# Patient Record
Sex: FEMALE | Race: WHITE | NOT HISPANIC OR LATINO
[De-identification: names, ages, dates, MRNs, and addresses within clinical notes are randomized per-mention and may not be internally consistent; named-entity substitution may affect disease eponyms.]

---

## 2017-01-10 ENCOUNTER — NON-APPOINTMENT (OUTPATIENT)
Age: 82
End: 2017-01-10

## 2017-01-10 ENCOUNTER — APPOINTMENT (OUTPATIENT)
Dept: CARDIOLOGY | Facility: CLINIC | Age: 82
End: 2017-01-10

## 2017-01-10 ENCOUNTER — LABORATORY RESULT (OUTPATIENT)
Age: 82
End: 2017-01-10

## 2017-01-10 VITALS
HEART RATE: 74 BPM | SYSTOLIC BLOOD PRESSURE: 160 MMHG | OXYGEN SATURATION: 95 % | RESPIRATION RATE: 14 BRPM | TEMPERATURE: 97.9 F | DIASTOLIC BLOOD PRESSURE: 80 MMHG

## 2017-01-10 VITALS — DIASTOLIC BLOOD PRESSURE: 84 MMHG | SYSTOLIC BLOOD PRESSURE: 210 MMHG

## 2017-01-13 ENCOUNTER — MEDICATION RENEWAL (OUTPATIENT)
Age: 82
End: 2017-01-13

## 2017-01-13 LAB
APPEARANCE: CLEAR
BILIRUBIN URINE: NEGATIVE
BLOOD URINE: NEGATIVE
COLOR: YELLOW
GLUCOSE QUALITATIVE U: NORMAL MG/DL
KETONES URINE: NEGATIVE
LEUKOCYTE ESTERASE URINE: NEGATIVE
NITRITE URINE: POSITIVE
PH URINE: 6
PROTEIN URINE: ABNORMAL MG/DL
SPECIFIC GRAVITY URINE: 1.02
UROBILINOGEN URINE: 1 MG/DL

## 2017-01-13 RX ORDER — SULFAMETHOXAZOLE AND TRIMETHOPRIM 800; 160 MG/1; MG/1
800-160 TABLET ORAL TWICE DAILY
Qty: 20 | Refills: 0 | Status: ACTIVE | COMMUNITY
Start: 2017-01-13 | End: 1900-01-01

## 2017-01-16 ENCOUNTER — LABORATORY RESULT (OUTPATIENT)
Age: 82
End: 2017-01-16

## 2017-01-17 ENCOUNTER — APPOINTMENT (OUTPATIENT)
Dept: CARDIOLOGY | Facility: CLINIC | Age: 82
End: 2017-01-17

## 2017-01-17 VITALS
OXYGEN SATURATION: 96 % | DIASTOLIC BLOOD PRESSURE: 100 MMHG | HEART RATE: 71 BPM | SYSTOLIC BLOOD PRESSURE: 180 MMHG | WEIGHT: 116 LBS | BODY MASS INDEX: 20.55 KG/M2 | HEIGHT: 63 IN

## 2017-01-18 ENCOUNTER — NON-APPOINTMENT (OUTPATIENT)
Age: 82
End: 2017-01-18

## 2017-01-18 LAB
ALBUMIN SERPL ELPH-MCNC: 4.3 G/DL
ALP BLD-CCNC: 67 U/L
ALT SERPL-CCNC: 17 U/L
ANION GAP SERPL CALC-SCNC: 13 MMOL/L
AST SERPL-CCNC: 29 U/L
BILIRUB SERPL-MCNC: 0.3 MG/DL
BUN SERPL-MCNC: 28 MG/DL
CALCIUM SERPL-MCNC: 9.4 MG/DL
CHLORIDE SERPL-SCNC: 101 MMOL/L
CO2 SERPL-SCNC: 25 MMOL/L
CREAT SERPL-MCNC: 0.91 MG/DL
GLUCOSE SERPL-MCNC: 101 MG/DL
MAGNESIUM SERPL-MCNC: 2.3 MG/DL
POTASSIUM SERPL-SCNC: 4.6 MMOL/L
PROT SERPL-MCNC: 7.1 G/DL
SODIUM SERPL-SCNC: 139 MMOL/L

## 2017-01-23 ENCOUNTER — MEDICATION RENEWAL (OUTPATIENT)
Age: 82
End: 2017-01-23

## 2017-01-31 ENCOUNTER — APPOINTMENT (OUTPATIENT)
Dept: CARDIOLOGY | Facility: CLINIC | Age: 82
End: 2017-01-31

## 2017-02-03 ENCOUNTER — RX RENEWAL (OUTPATIENT)
Age: 82
End: 2017-02-03

## 2017-02-17 ENCOUNTER — APPOINTMENT (OUTPATIENT)
Dept: CARDIOLOGY | Facility: CLINIC | Age: 82
End: 2017-02-17

## 2017-02-27 ENCOUNTER — MEDICATION RENEWAL (OUTPATIENT)
Age: 82
End: 2017-02-27

## 2017-03-07 ENCOUNTER — APPOINTMENT (OUTPATIENT)
Dept: CARDIOLOGY | Facility: CLINIC | Age: 82
End: 2017-03-07

## 2017-03-07 VITALS
WEIGHT: 112 LBS | HEIGHT: 63 IN | DIASTOLIC BLOOD PRESSURE: 70 MMHG | BODY MASS INDEX: 19.84 KG/M2 | OXYGEN SATURATION: 95 % | SYSTOLIC BLOOD PRESSURE: 140 MMHG

## 2017-03-07 DIAGNOSIS — Z87.19 PERSONAL HISTORY OF OTHER DISEASES OF THE DIGESTIVE SYSTEM: ICD-10-CM

## 2017-03-07 DIAGNOSIS — Z87.440 PERSONAL HISTORY OF URINARY (TRACT) INFECTIONS: ICD-10-CM

## 2017-03-07 RX ORDER — AMLODIPINE BESYLATE 2.5 MG/1
2.5 TABLET ORAL
Qty: 30 | Refills: 0 | Status: DISCONTINUED | COMMUNITY
Start: 2017-01-10 | End: 2017-03-07

## 2017-03-08 ENCOUNTER — NON-APPOINTMENT (OUTPATIENT)
Age: 82
End: 2017-03-08

## 2017-04-24 ENCOUNTER — RX RENEWAL (OUTPATIENT)
Age: 82
End: 2017-04-24

## 2017-05-13 ENCOUNTER — EMERGENCY (EMERGENCY)
Facility: HOSPITAL | Age: 82
LOS: 0 days | Discharge: HOME | End: 2017-05-13
Attending: FAMILY MEDICINE | Admitting: FAMILY MEDICINE
Payer: MEDICARE

## 2017-05-13 VITALS
TEMPERATURE: 99 F | DIASTOLIC BLOOD PRESSURE: 90 MMHG | HEIGHT: 63 IN | OXYGEN SATURATION: 100 % | SYSTOLIC BLOOD PRESSURE: 238 MMHG | RESPIRATION RATE: 19 BRPM | HEART RATE: 76 BPM | WEIGHT: 115.08 LBS

## 2017-05-13 VITALS — DIASTOLIC BLOOD PRESSURE: 72 MMHG | HEART RATE: 75 BPM | SYSTOLIC BLOOD PRESSURE: 160 MMHG

## 2017-05-13 DIAGNOSIS — Z87.891 PERSONAL HISTORY OF NICOTINE DEPENDENCE: ICD-10-CM

## 2017-05-13 DIAGNOSIS — N39.0 URINARY TRACT INFECTION, SITE NOT SPECIFIED: ICD-10-CM

## 2017-05-13 DIAGNOSIS — Z79.82 LONG TERM (CURRENT) USE OF ASPIRIN: ICD-10-CM

## 2017-05-13 DIAGNOSIS — B34.8 OTHER VIRAL INFECTIONS OF UNSPECIFIED SITE: ICD-10-CM

## 2017-05-13 DIAGNOSIS — Z79.02 LONG TERM (CURRENT) USE OF ANTITHROMBOTICS/ANTIPLATELETS: ICD-10-CM

## 2017-05-13 DIAGNOSIS — I10 ESSENTIAL (PRIMARY) HYPERTENSION: ICD-10-CM

## 2017-05-13 DIAGNOSIS — I50.9 HEART FAILURE, UNSPECIFIED: ICD-10-CM

## 2017-05-13 DIAGNOSIS — R05 COUGH: ICD-10-CM

## 2017-05-13 DIAGNOSIS — Z98.61 CORONARY ANGIOPLASTY STATUS: ICD-10-CM

## 2017-05-13 LAB
ALBUMIN SERPL ELPH-MCNC: 4.4 G/DL — SIGNIFICANT CHANGE UP (ref 3.3–5)
ALP SERPL-CCNC: 72 U/L — SIGNIFICANT CHANGE UP (ref 40–120)
ALT FLD-CCNC: 20 U/L — SIGNIFICANT CHANGE UP (ref 12–78)
ANION GAP SERPL CALC-SCNC: 8 MMOL/L — SIGNIFICANT CHANGE UP (ref 5–17)
APPEARANCE UR: (no result)
APTT BLD: 43 SEC — HIGH (ref 27.5–37.4)
AST SERPL-CCNC: 29 U/L — SIGNIFICANT CHANGE UP (ref 15–37)
BACTERIA # UR AUTO: (no result)
BASOPHILS # BLD AUTO: 0.1 K/UL — SIGNIFICANT CHANGE UP (ref 0–0.2)
BASOPHILS NFR BLD AUTO: 0.9 % — SIGNIFICANT CHANGE UP (ref 0–2)
BILIRUB SERPL-MCNC: 0.7 MG/DL — SIGNIFICANT CHANGE UP (ref 0.2–1.2)
BILIRUB UR-MCNC: NEGATIVE — SIGNIFICANT CHANGE UP
BUN SERPL-MCNC: 19 MG/DL — SIGNIFICANT CHANGE UP (ref 7–23)
CALCIUM SERPL-MCNC: 9.2 MG/DL — SIGNIFICANT CHANGE UP (ref 8.5–10.1)
CHLORIDE SERPL-SCNC: 102 MMOL/L — SIGNIFICANT CHANGE UP (ref 96–108)
CO2 SERPL-SCNC: 27 MMOL/L — SIGNIFICANT CHANGE UP (ref 22–31)
COLOR SPEC: YELLOW — SIGNIFICANT CHANGE UP
COMMENT - URINE: SIGNIFICANT CHANGE UP
CREAT SERPL-MCNC: 0.82 MG/DL — SIGNIFICANT CHANGE UP (ref 0.5–1.3)
DIFF PNL FLD: (no result)
EOSINOPHIL # BLD AUTO: 0 K/UL — SIGNIFICANT CHANGE UP (ref 0–0.5)
EOSINOPHIL NFR BLD AUTO: 0.2 % — SIGNIFICANT CHANGE UP (ref 0–6)
EPI CELLS # UR: SIGNIFICANT CHANGE UP
GLUCOSE SERPL-MCNC: 91 MG/DL — SIGNIFICANT CHANGE UP (ref 70–99)
GLUCOSE UR QL: NEGATIVE MG/DL — SIGNIFICANT CHANGE UP
HCT VFR BLD CALC: 44.1 % — SIGNIFICANT CHANGE UP (ref 34.5–45)
HGB BLD-MCNC: 15 G/DL — SIGNIFICANT CHANGE UP (ref 11.5–15.5)
HPIV3 RNA SPEC QL NAA+PROBE: DETECTED
INR BLD: 1.18 RATIO — HIGH (ref 0.88–1.16)
KETONES UR-MCNC: NEGATIVE — SIGNIFICANT CHANGE UP
LACTATE SERPL-SCNC: 0.9 MMOL/L — SIGNIFICANT CHANGE UP (ref 0.7–2)
LEUKOCYTE ESTERASE UR-ACNC: (no result)
LYMPHOCYTES # BLD AUTO: 1 K/UL — SIGNIFICANT CHANGE UP (ref 1–3.3)
LYMPHOCYTES # BLD AUTO: 13.4 % — SIGNIFICANT CHANGE UP (ref 13–44)
MCHC RBC-ENTMCNC: 30.7 PG — SIGNIFICANT CHANGE UP (ref 27–34)
MCHC RBC-ENTMCNC: 34 GM/DL — SIGNIFICANT CHANGE UP (ref 32–36)
MCV RBC AUTO: 90.5 FL — SIGNIFICANT CHANGE UP (ref 80–100)
MONOCYTES # BLD AUTO: 0.7 K/UL — SIGNIFICANT CHANGE UP (ref 0–0.9)
MONOCYTES NFR BLD AUTO: 9.2 % — SIGNIFICANT CHANGE UP (ref 2–14)
NEUTROPHILS # BLD AUTO: 5.5 K/UL — SIGNIFICANT CHANGE UP (ref 1.8–7.4)
NEUTROPHILS NFR BLD AUTO: 76.2 % — SIGNIFICANT CHANGE UP (ref 43–77)
NITRITE UR-MCNC: NEGATIVE — SIGNIFICANT CHANGE UP
PH UR: 5 — SIGNIFICANT CHANGE UP (ref 5–8)
PLATELET # BLD AUTO: 524 K/UL — HIGH (ref 150–400)
POTASSIUM SERPL-MCNC: 3.9 MMOL/L — SIGNIFICANT CHANGE UP (ref 3.5–5.3)
POTASSIUM SERPL-SCNC: 3.9 MMOL/L — SIGNIFICANT CHANGE UP (ref 3.5–5.3)
PROT SERPL-MCNC: 8.1 GM/DL — SIGNIFICANT CHANGE UP (ref 6–8.3)
PROT UR-MCNC: 30 MG/DL
PROTHROM AB SERPL-ACNC: 12.8 SEC — HIGH (ref 9.8–12.7)
RAPID RVP RESULT: DETECTED
RBC # BLD: 4.88 M/UL — SIGNIFICANT CHANGE UP (ref 3.8–5.2)
RBC # FLD: 12.9 % — SIGNIFICANT CHANGE UP (ref 10.3–14.5)
RBC CASTS # UR COMP ASSIST: (no result) /HPF (ref 0–4)
SODIUM SERPL-SCNC: 137 MMOL/L — SIGNIFICANT CHANGE UP (ref 135–145)
SP GR SPEC: 1.02 — SIGNIFICANT CHANGE UP (ref 1.01–1.02)
UROBILINOGEN FLD QL: NEGATIVE MG/DL — SIGNIFICANT CHANGE UP
WBC # BLD: 7.2 K/UL — SIGNIFICANT CHANGE UP (ref 3.8–10.5)
WBC # FLD AUTO: 7.2 K/UL — SIGNIFICANT CHANGE UP (ref 3.8–10.5)
WBC UR QL: SIGNIFICANT CHANGE UP /HPF (ref 0–5)

## 2017-05-13 PROCEDURE — 93010 ELECTROCARDIOGRAM REPORT: CPT

## 2017-05-13 PROCEDURE — 71010: CPT | Mod: 26

## 2017-05-13 PROCEDURE — 99285 EMERGENCY DEPT VISIT HI MDM: CPT | Mod: 25

## 2017-05-13 RX ORDER — LOSARTAN POTASSIUM 100 MG/1
25 TABLET, FILM COATED ORAL ONCE
Qty: 0 | Refills: 0 | Status: COMPLETED | OUTPATIENT
Start: 2017-05-13 | End: 2017-05-13

## 2017-05-13 RX ORDER — CEPHALEXIN 500 MG
1 CAPSULE ORAL
Qty: 40 | Refills: 0 | OUTPATIENT
Start: 2017-05-13 | End: 2017-05-23

## 2017-05-13 RX ORDER — ALBUTEROL 90 UG/1
2.5 AEROSOL, METERED ORAL ONCE
Qty: 0 | Refills: 0 | Status: COMPLETED | OUTPATIENT
Start: 2017-05-13 | End: 2017-05-13

## 2017-05-13 RX ORDER — IPRATROPIUM BROMIDE 0.2 MG/ML
500 SOLUTION, NON-ORAL INHALATION ONCE
Qty: 0 | Refills: 0 | Status: COMPLETED | OUTPATIENT
Start: 2017-05-13 | End: 2017-05-13

## 2017-05-13 RX ORDER — SODIUM CHLORIDE 9 MG/ML
1000 INJECTION INTRAMUSCULAR; INTRAVENOUS; SUBCUTANEOUS
Qty: 0 | Refills: 0 | Status: DISCONTINUED | OUTPATIENT
Start: 2017-05-13 | End: 2017-05-13

## 2017-05-13 RX ORDER — SODIUM CHLORIDE 9 MG/ML
250 INJECTION INTRAMUSCULAR; INTRAVENOUS; SUBCUTANEOUS ONCE
Qty: 0 | Refills: 0 | Status: COMPLETED | OUTPATIENT
Start: 2017-05-13 | End: 2017-05-13

## 2017-05-13 RX ORDER — ALBUTEROL 90 UG/1
1 AEROSOL, METERED ORAL ONCE
Qty: 0 | Refills: 0 | Status: COMPLETED | OUTPATIENT
Start: 2017-05-13 | End: 2017-05-13

## 2017-05-13 RX ORDER — CEPHALEXIN 500 MG
500 CAPSULE ORAL
Qty: 0 | Refills: 0 | Status: DISCONTINUED | OUTPATIENT
Start: 2017-05-13 | End: 2017-05-13

## 2017-05-13 RX ORDER — SODIUM CHLORIDE 9 MG/ML
3 INJECTION INTRAMUSCULAR; INTRAVENOUS; SUBCUTANEOUS ONCE
Qty: 0 | Refills: 0 | Status: COMPLETED | OUTPATIENT
Start: 2017-05-13 | End: 2017-05-13

## 2017-05-13 RX ORDER — CARVEDILOL PHOSPHATE 80 MG/1
6.25 CAPSULE, EXTENDED RELEASE ORAL ONCE
Qty: 0 | Refills: 0 | Status: COMPLETED | OUTPATIENT
Start: 2017-05-13 | End: 2017-05-13

## 2017-05-13 RX ADMIN — ALBUTEROL 2.5 MILLIGRAM(S): 90 AEROSOL, METERED ORAL at 18:31

## 2017-05-13 RX ADMIN — CARVEDILOL PHOSPHATE 6.25 MILLIGRAM(S): 80 CAPSULE, EXTENDED RELEASE ORAL at 18:15

## 2017-05-13 RX ADMIN — SODIUM CHLORIDE 500 MILLILITER(S): 9 INJECTION INTRAMUSCULAR; INTRAVENOUS; SUBCUTANEOUS at 18:27

## 2017-05-13 RX ADMIN — LOSARTAN POTASSIUM 25 MILLIGRAM(S): 100 TABLET, FILM COATED ORAL at 18:15

## 2017-05-13 RX ADMIN — SODIUM CHLORIDE 3 MILLILITER(S): 9 INJECTION INTRAMUSCULAR; INTRAVENOUS; SUBCUTANEOUS at 18:25

## 2017-05-13 RX ADMIN — Medication 500 MILLIGRAM(S): at 21:49

## 2017-05-13 RX ADMIN — Medication 500 MICROGRAM(S): at 18:31

## 2017-05-13 RX ADMIN — ALBUTEROL 1 PUFF(S): 90 AEROSOL, METERED ORAL at 22:01

## 2017-05-13 NOTE — ED PROVIDER NOTE - NS ED MD SCRIBE ATTENDING SCRIBE SECTIONS
HISTORY OF PRESENT ILLNESS/HIV/DISPOSITION/REVIEW OF SYSTEMS/INTAKE ASSESSMENT/SCREENINGS/VITAL SIGNS( Pullset)/PAST MEDICAL/SURGICAL/SOCIAL HISTORY/RESULTS/CONSULTATIONS/SHIFT CHANGE/PROGRESS NOTE/PHYSICAL EXAM

## 2017-05-13 NOTE — ED ADULT TRIAGE NOTE - CHIEF COMPLAINT QUOTE
Pt reports that she had a "choking incident" while at dinner where she choked on a piece of chicken and had difficulty coughing in up. Pt reports increasing cough, generalized weakness, and chills at home.

## 2017-05-13 NOTE — ED STATDOCS - NS ED MD SCRIBE ATTENDING SCRIBE SECTIONS
PROGRESS NOTE/REVIEW OF SYSTEMS/DISPOSITION/HISTORY OF PRESENT ILLNESS/PHYSICAL EXAM/PAST MEDICAL/SURGICAL/SOCIAL HISTORY/RESULTS

## 2017-05-13 NOTE — ED PROVIDER NOTE - OBJECTIVE STATEMENT
89 y/o F PMHx 3 stents, CHF, former smoker about 1/2 pack/day, presents to the ED c/o cough. The pt provides that she has been having cough since 2 weeks associated with weakness. Pt notes that she had a choking episode 2 weeks ago. +chills 1 week. No h/o fever, nvd, headache, dysuria, rash or any other complaints. PMD  Dr. Garcia. Cards Dr. Smith. 89 y/o F PMHx 3 stents, CHF, former smoker about 1/2 pack/day, presents to the ED c/o cough. The pt provides that she has been having non productive cough since 2 weeks associated with weakness. Pt notes that she had a choking episode 2 weeks ago and the cough started after that. +chills 1 week. No h/o fever, nvd, headache, dysuria, rash or any other complaints. PMD  Dr. Garcia. Cards Dr. Smith.

## 2017-05-13 NOTE — ED ADULT NURSE NOTE - ED STAT RN HANDOFF DETAILS
Received report from TERE Gutierrez and reassessed patient. Agree with the previous RN assessment. Patient is awaiting lab and radiology results. MD Mclaughlin updated with patient's vital signs, no new orders. Patient reports mild weakness, no change in status or complaints at this time. Will continue to monitor/reassess.

## 2017-05-13 NOTE — ED ADULT NURSE REASSESSMENT NOTE - NS ED NURSE REASSESS COMMENT FT1
Notified Dr. Mclaughlin re pt's BP, pt's daughter was unsure of pt's med doses, also stated new BP med was ordered, called pt's phcy and obtained med list, Dr. Mclaughlin ordered pt's 2 home meds for BP control which were given.

## 2017-05-13 NOTE — ED STATDOCS - MEDICAL DECISION MAKING DETAILS
89 year old with weaknes, chills, HTN, with choking episode 2 days ago, likely developed pneumonia, possible aspiration. Pt. will goto main ED for further eval.

## 2017-05-13 NOTE — ED ADULT NURSE REASSESSMENT NOTE - NS ED NURSE REASSESS COMMENT FT1
2100: Patient's blood pressure improved post medication administration. Patient reports no SOB/dyspnea. Noted to have positive parainfluenza. Patient education on inhaler usage preformed. Patient awaiting MD reassessment and disposition. Will continue to monitor/reassess.

## 2017-05-13 NOTE — ED ADULT NURSE NOTE - OBJECTIVE STATEMENT
Pt states she's had a cough for 2 weeks, didn't see PCP because it didn't bother her that much, now feels generally weak and throat feels scratchy

## 2017-05-13 NOTE — ED STATDOCS - PROGRESS NOTE DETAILS
89 y/o female with a h/o HTN, presenting to ED for increased generalized weakness over the passed few days associated with chills. Daughter states pt had a choking episode a few days ago, currently c/o throat pain. Pt lives alone. Pt sent to main ED for further eval.

## 2017-05-19 LAB
CULTURE RESULTS: SIGNIFICANT CHANGE UP
CULTURE RESULTS: SIGNIFICANT CHANGE UP
SPECIMEN SOURCE: SIGNIFICANT CHANGE UP
SPECIMEN SOURCE: SIGNIFICANT CHANGE UP

## 2017-05-23 ENCOUNTER — APPOINTMENT (OUTPATIENT)
Dept: CARDIOLOGY | Facility: CLINIC | Age: 82
End: 2017-05-23

## 2017-05-23 ENCOUNTER — NON-APPOINTMENT (OUTPATIENT)
Age: 82
End: 2017-05-23

## 2017-05-23 VITALS
BODY MASS INDEX: 19.49 KG/M2 | OXYGEN SATURATION: 95 % | RESPIRATION RATE: 14 BRPM | HEART RATE: 74 BPM | WEIGHT: 110 LBS | SYSTOLIC BLOOD PRESSURE: 140 MMHG | HEIGHT: 63 IN | TEMPERATURE: 97.9 F | DIASTOLIC BLOOD PRESSURE: 72 MMHG

## 2017-06-16 ENCOUNTER — RX RENEWAL (OUTPATIENT)
Age: 82
End: 2017-06-16

## 2017-06-30 ENCOUNTER — RX RENEWAL (OUTPATIENT)
Age: 82
End: 2017-06-30

## 2017-07-21 ENCOUNTER — INPATIENT (INPATIENT)
Facility: HOSPITAL | Age: 82
LOS: 2 days | Discharge: HOME | End: 2017-07-24
Attending: FAMILY MEDICINE | Admitting: FAMILY MEDICINE
Payer: MEDICARE

## 2017-07-21 VITALS — HEIGHT: 62 IN | WEIGHT: 108.03 LBS

## 2017-07-21 LAB
ALBUMIN SERPL ELPH-MCNC: 3.5 G/DL — SIGNIFICANT CHANGE UP (ref 3.3–5)
ALP SERPL-CCNC: 58 U/L — SIGNIFICANT CHANGE UP (ref 40–120)
ALT FLD-CCNC: 20 U/L — SIGNIFICANT CHANGE UP (ref 12–78)
ANION GAP SERPL CALC-SCNC: 5 MMOL/L — SIGNIFICANT CHANGE UP (ref 5–17)
APPEARANCE UR: (no result)
AST SERPL-CCNC: 20 U/L — SIGNIFICANT CHANGE UP (ref 15–37)
BACTERIA # UR AUTO: (no result)
BASOPHILS # BLD AUTO: 0.1 K/UL — SIGNIFICANT CHANGE UP (ref 0–0.2)
BASOPHILS NFR BLD AUTO: 1 % — SIGNIFICANT CHANGE UP (ref 0–2)
BILIRUB SERPL-MCNC: 0.6 MG/DL — SIGNIFICANT CHANGE UP (ref 0.2–1.2)
BILIRUB UR-MCNC: NEGATIVE — SIGNIFICANT CHANGE UP
BUN SERPL-MCNC: 27 MG/DL — HIGH (ref 7–23)
CALCIUM SERPL-MCNC: 8.5 MG/DL — SIGNIFICANT CHANGE UP (ref 8.5–10.1)
CHLORIDE SERPL-SCNC: 103 MMOL/L — SIGNIFICANT CHANGE UP (ref 96–108)
CK SERPL-CCNC: 60 U/L — SIGNIFICANT CHANGE UP (ref 26–192)
CO2 SERPL-SCNC: 29 MMOL/L — SIGNIFICANT CHANGE UP (ref 22–31)
COLOR SPEC: YELLOW — SIGNIFICANT CHANGE UP
CREAT SERPL-MCNC: 0.87 MG/DL — SIGNIFICANT CHANGE UP (ref 0.5–1.3)
DIFF PNL FLD: (no result)
EOSINOPHIL # BLD AUTO: 0 K/UL — SIGNIFICANT CHANGE UP (ref 0–0.5)
EOSINOPHIL NFR BLD AUTO: 0.6 % — SIGNIFICANT CHANGE UP (ref 0–6)
EPI CELLS # UR: SIGNIFICANT CHANGE UP
GLUCOSE SERPL-MCNC: 112 MG/DL — HIGH (ref 70–99)
GLUCOSE UR QL: NEGATIVE MG/DL — SIGNIFICANT CHANGE UP
HCT VFR BLD CALC: 36.1 % — SIGNIFICANT CHANGE UP (ref 34.5–45)
HGB BLD-MCNC: 12.4 G/DL — SIGNIFICANT CHANGE UP (ref 11.5–15.5)
KETONES UR-MCNC: NEGATIVE — SIGNIFICANT CHANGE UP
LEUKOCYTE ESTERASE UR-ACNC: (no result)
LYMPHOCYTES # BLD AUTO: 1.3 K/UL — SIGNIFICANT CHANGE UP (ref 1–3.3)
LYMPHOCYTES # BLD AUTO: 17.5 % — SIGNIFICANT CHANGE UP (ref 13–44)
MCHC RBC-ENTMCNC: 30.6 PG — SIGNIFICANT CHANGE UP (ref 27–34)
MCHC RBC-ENTMCNC: 34.4 GM/DL — SIGNIFICANT CHANGE UP (ref 32–36)
MCV RBC AUTO: 89.1 FL — SIGNIFICANT CHANGE UP (ref 80–100)
MONOCYTES # BLD AUTO: 0.8 K/UL — SIGNIFICANT CHANGE UP (ref 0–0.9)
MONOCYTES NFR BLD AUTO: 11.1 % — SIGNIFICANT CHANGE UP (ref 2–14)
NEUTROPHILS # BLD AUTO: 5.3 K/UL — SIGNIFICANT CHANGE UP (ref 1.8–7.4)
NEUTROPHILS NFR BLD AUTO: 69.8 % — SIGNIFICANT CHANGE UP (ref 43–77)
NITRITE UR-MCNC: NEGATIVE — SIGNIFICANT CHANGE UP
PH UR: 6 — SIGNIFICANT CHANGE UP (ref 5–8)
PLATELET # BLD AUTO: 418 K/UL — HIGH (ref 150–400)
POTASSIUM SERPL-MCNC: 3.5 MMOL/L — SIGNIFICANT CHANGE UP (ref 3.5–5.3)
POTASSIUM SERPL-SCNC: 3.5 MMOL/L — SIGNIFICANT CHANGE UP (ref 3.5–5.3)
PROT SERPL-MCNC: 6.2 GM/DL — SIGNIFICANT CHANGE UP (ref 6–8.3)
PROT UR-MCNC: 15 MG/DL
RBC # BLD: 4.06 M/UL — SIGNIFICANT CHANGE UP (ref 3.8–5.2)
RBC # FLD: 12.5 % — SIGNIFICANT CHANGE UP (ref 10.3–14.5)
RBC CASTS # UR COMP ASSIST: SIGNIFICANT CHANGE UP /HPF (ref 0–4)
SODIUM SERPL-SCNC: 137 MMOL/L — SIGNIFICANT CHANGE UP (ref 135–145)
SP GR SPEC: 1.02 — SIGNIFICANT CHANGE UP (ref 1.01–1.02)
TROPONIN I SERPL-MCNC: <0.015 NG/ML — SIGNIFICANT CHANGE UP (ref 0.01–0.04)
UROBILINOGEN FLD QL: 1 MG/DL
WBC # BLD: 7.6 K/UL — SIGNIFICANT CHANGE UP (ref 3.8–10.5)
WBC # FLD AUTO: 7.6 K/UL — SIGNIFICANT CHANGE UP (ref 3.8–10.5)
WBC UR QL: (no result)

## 2017-07-21 PROCEDURE — 71010: CPT | Mod: 26

## 2017-07-21 PROCEDURE — 70450 CT HEAD/BRAIN W/O DYE: CPT | Mod: 26

## 2017-07-21 PROCEDURE — 99285 EMERGENCY DEPT VISIT HI MDM: CPT

## 2017-07-21 PROCEDURE — 93010 ELECTROCARDIOGRAM REPORT: CPT

## 2017-07-21 RX ORDER — CEFTRIAXONE 500 MG/1
1 INJECTION, POWDER, FOR SOLUTION INTRAMUSCULAR; INTRAVENOUS EVERY 24 HOURS
Qty: 0 | Refills: 0 | Status: DISCONTINUED | OUTPATIENT
Start: 2017-07-21 | End: 2017-07-24

## 2017-07-21 RX ORDER — AMLODIPINE BESYLATE 2.5 MG/1
2.5 TABLET ORAL AT BEDTIME
Qty: 0 | Refills: 0 | Status: DISCONTINUED | OUTPATIENT
Start: 2017-07-21 | End: 2017-07-24

## 2017-07-21 RX ORDER — HEPARIN SODIUM 5000 [USP'U]/ML
5000 INJECTION INTRAVENOUS; SUBCUTANEOUS EVERY 12 HOURS
Qty: 0 | Refills: 0 | Status: DISCONTINUED | OUTPATIENT
Start: 2017-07-21 | End: 2017-07-24

## 2017-07-21 RX ORDER — CARVEDILOL PHOSPHATE 80 MG/1
6.25 CAPSULE, EXTENDED RELEASE ORAL EVERY 12 HOURS
Qty: 0 | Refills: 0 | Status: DISCONTINUED | OUTPATIENT
Start: 2017-07-21 | End: 2017-07-24

## 2017-07-21 RX ORDER — ACETAMINOPHEN 500 MG
650 TABLET ORAL EVERY 6 HOURS
Qty: 0 | Refills: 0 | Status: DISCONTINUED | OUTPATIENT
Start: 2017-07-21 | End: 2017-07-24

## 2017-07-21 RX ORDER — ASPIRIN/CALCIUM CARB/MAGNESIUM 324 MG
81 TABLET ORAL DAILY
Qty: 0 | Refills: 0 | Status: DISCONTINUED | OUTPATIENT
Start: 2017-07-21 | End: 2017-07-24

## 2017-07-21 RX ORDER — CLOPIDOGREL BISULFATE 75 MG/1
75 TABLET, FILM COATED ORAL DAILY
Qty: 0 | Refills: 0 | Status: DISCONTINUED | OUTPATIENT
Start: 2017-07-21 | End: 2017-07-24

## 2017-07-21 RX ORDER — LOSARTAN POTASSIUM 100 MG/1
1 TABLET, FILM COATED ORAL
Qty: 0 | Refills: 0 | COMMUNITY

## 2017-07-21 RX ORDER — FUROSEMIDE 40 MG
20 TABLET ORAL DAILY
Qty: 0 | Refills: 0 | Status: DISCONTINUED | OUTPATIENT
Start: 2017-07-21 | End: 2017-07-22

## 2017-07-21 RX ADMIN — CEFTRIAXONE 100 GRAM(S): 500 INJECTION, POWDER, FOR SOLUTION INTRAMUSCULAR; INTRAVENOUS at 17:45

## 2017-07-21 RX ADMIN — CARVEDILOL PHOSPHATE 6.25 MILLIGRAM(S): 80 CAPSULE, EXTENDED RELEASE ORAL at 22:33

## 2017-07-21 RX ADMIN — AMLODIPINE BESYLATE 2.5 MILLIGRAM(S): 2.5 TABLET ORAL at 22:33

## 2017-07-21 NOTE — H&P ADULT - ASSESSMENT
Pt is admitted w/    I. Urinary infection, positive UA, hypotension at PCPs office  - u and blood cx  - IVF  - Rocephin started  - lasix held today      II. Weakness  - ambulate with assistance, falls risk  - Physical therapy , pt may need a walker    III. DVT prophylaxis  - heparin Pt is a 89 y/o woman w/ PMHx of HTN, shoulder replacement, positional vertigo, UTI who is  c/o weakness, SOB, sent in from doctor's office for hypotension to 100/60. Pt's daughter reports pt has been weak for 5 days with some decrease in oral intake.  As per daughter, pt can walk but is off balance and almost fall back. Pt denies fever, chills HA, CP.    UA is positive    Pt is admitted w/    I. Urinary infection, positive UA, hypotension at PCPs office  - u and blood cx  - IVF  - Rocephin started, will cont  - lasix held today      II. Weakness  - ambulate with assistance, falls risk  - Physical therapy , pt may need a walker    III. DVT prophylaxis  - heparin Pt is a 89 y/o woman w/ PMHx of HTN, shoulder replacement, positional vertigo, UTI who is  c/o weakness, SOB, sent in from doctor's office for hypotension to 100/60. Pt's daughter reports pt has been weak for 5 days with some decrease in oral intake.  As per daughter, pt can walk but is off balance and almost fall back. Pt denies fever, chills HA, CP.    UA is positive    Pt is admitted w/    I. Urinary infection, positive UA, hypotension at PCPs office  - u and blood cx  - IVF  - Rocephin started, will cont  - lasix held today      II. CABG  - cont statin, ASA, plavix, Coreg    III.  Weakness  - ambulate with assistance, falls risk  - Physical therapy , pt may need a walker    IV. DVT prophylaxis  - heparin

## 2017-07-21 NOTE — ED ADULT TRIAGE NOTE - CHIEF COMPLAINT QUOTE
c/o increased weakness, shortness of breath, sent in from doctor's office for hypotension as per patient's daughter.

## 2017-07-21 NOTE — ED STATDOCS - MEDICAL DECISION MAKING DETAILS
89 y/o F c/o generalized weakness, concern for CVA, ACS, infection, anemia, electrolyte dist, plan for labs and reassess after imaging.

## 2017-07-21 NOTE — H&P ADULT - NSHPREVIEWOFSYSTEMS_GEN_ALL_CORE
ICU Vital Signs Last 24 Hrs    T(F): 97.7 (21 Jul 2017 19:19), Max: 97.7 (21 Jul 2017 14:00)    HR: 65 (21 Jul 2017 19:19) (65 - 66)    BP: 158/47 (21 Jul 2017 19:19) (146/52 - 158/47)    RR: 17   SpO2: 97%

## 2017-07-21 NOTE — ED STATDOCS - PROGRESS NOTE DETAILS
89 yo female with a PMH of htn, hld, cardiac bypass, 5 stents, on plavix, parkinsonism, presents with failure to thrive x 7-10 days. Per family, pt lives upstairs alone and has been able to do things for herself but not recently. +decreased appetite, entire body aches. Denies f/c/sweating, cough, cp, sob, abd pain, n/v/d. PMD= caparuscio, Cardio- strizik. -Abhi Rose PA-C

## 2017-07-21 NOTE — PATIENT PROFILE ADULT. - ABILITY TO HEAR (WITH HEARING AID OR HEARING APPLIANCE IF NORMALLY USED):
Hard of hearing/Mildly to Moderately Impaired: difficulty hearing in some environments or speaker may need to increase volume or speak distinctly

## 2017-07-21 NOTE — ED STATDOCS - OBJECTIVE STATEMENT
89 y/o F w/ PMHx of HTN, shoulder replacement, positional vertigo, c/o weakness, SOB, sent in from doctor's office for hypotension as per pt's daughter. Pt's daughter states that pt woke up drenched in sweat with productive cough 5 days ago. As per daughter, pt can walk but is off balance. Pt denies fever, HA, CP. PMD Caporuscio.

## 2017-07-21 NOTE — ED STATDOCS - CARE PLAN
Principal Discharge DX:	Weakness  Secondary Diagnosis:	Urinary tract infection with hematuria, site unspecified

## 2017-07-21 NOTE — ED ADULT NURSE NOTE - OBJECTIVE STATEMENT
Pt presents with weakness x1 week. Pt denies any pain at this time. Pt daughter states BP has been very low. Daughter at bedside.

## 2017-07-21 NOTE — ED STATDOCS - ATTENDING CONTRIBUTION TO CARE
I, Kvng Acosta DO,  performed the initial face to face bedside interview with this patient regarding history of present illness, review of symptoms and relevant past medical, social and family history.  I completed an independent physical examination.  I was the initial provider who evaluated this patient. I have signed out the follow up of any pending tests (i.e. labs, radiological studies) to the ACP.  I have communicated the patient’s plan of care and disposition with the ACP.  The history, relevant review of systems, past medical and surgical history, medical decision making, and physical examination was documented by the scribe in my presence and I attest to the accuracy of the documentation.

## 2017-07-21 NOTE — H&P ADULT - HISTORY OF PRESENT ILLNESS
Pt is a 89 y/o woman w/ PMHx of HTN, shoulder replacement, positional vertigo, c/o weakness, SOB, sent in from doctor's office for hypotension as per pt's daughter. Pt's daughter states that pt woke up drenched in sweat with productive cough 5 days ago. As per daughter, pt can walk but is off balance. Pt denies fever, HA, CP. Pt is a 89 y/o woman w/ PMHx of HTN, shoulder replacement, positional vertigo, UTI who is  c/o weakness, SOB, sent in from doctor's office for hypotension to 100/60. Pt's daughter reports pt has been weak for 5 days with some decrease in oral intake.  As per daughter, pt can walk but is off balance and almost fall back. Pt denies fever, chills HA, CP.  Pt is on lasix for HTN,  no hx of CHF    Fam Hx:  non-contrib to current adm Pt is a 89 y/o woman w/ PMHx of HTN, CABG, shoulder replacement, positional vertigo, UTI who is  c/o weakness, SOB, sent in from doctor's office for hypotension to 100/60. Pt's daughter reports pt has been weak for 5 days with some decrease in oral intake.  As per daughter, pt can walk but is off balance and almost fall back. Pt denies fever, chills HA, CP.  Pt is on lasix for HTN,  no hx of CHF    Fam Hx:  non-contrib to current adm

## 2017-07-22 LAB
ANION GAP SERPL CALC-SCNC: 6 MMOL/L — SIGNIFICANT CHANGE UP (ref 5–17)
BASOPHILS # BLD AUTO: 0.1 K/UL — SIGNIFICANT CHANGE UP (ref 0–0.2)
BASOPHILS NFR BLD AUTO: 1.2 % — SIGNIFICANT CHANGE UP (ref 0–2)
BUN SERPL-MCNC: 19 MG/DL — SIGNIFICANT CHANGE UP (ref 7–23)
CALCIUM SERPL-MCNC: 8.5 MG/DL — SIGNIFICANT CHANGE UP (ref 8.5–10.1)
CHLORIDE SERPL-SCNC: 106 MMOL/L — SIGNIFICANT CHANGE UP (ref 96–108)
CO2 SERPL-SCNC: 28 MMOL/L — SIGNIFICANT CHANGE UP (ref 22–31)
CREAT SERPL-MCNC: 0.76 MG/DL — SIGNIFICANT CHANGE UP (ref 0.5–1.3)
EOSINOPHIL # BLD AUTO: 0.1 K/UL — SIGNIFICANT CHANGE UP (ref 0–0.5)
EOSINOPHIL NFR BLD AUTO: 1.5 % — SIGNIFICANT CHANGE UP (ref 0–6)
GIANT PLATELETS BLD QL SMEAR: PRESENT — SIGNIFICANT CHANGE UP
GLUCOSE SERPL-MCNC: 84 MG/DL — SIGNIFICANT CHANGE UP (ref 70–99)
HCT VFR BLD CALC: 36.1 % — SIGNIFICANT CHANGE UP (ref 34.5–45)
HGB BLD-MCNC: 12.5 G/DL — SIGNIFICANT CHANGE UP (ref 11.5–15.5)
LYMPHOCYTES # BLD AUTO: 1.5 K/UL — SIGNIFICANT CHANGE UP (ref 1–3.3)
LYMPHOCYTES # BLD AUTO: 26 % — SIGNIFICANT CHANGE UP (ref 13–44)
MCHC RBC-ENTMCNC: 30.9 PG — SIGNIFICANT CHANGE UP (ref 27–34)
MCHC RBC-ENTMCNC: 34.6 GM/DL — SIGNIFICANT CHANGE UP (ref 32–36)
MCV RBC AUTO: 89.2 FL — SIGNIFICANT CHANGE UP (ref 80–100)
MONOCYTES # BLD AUTO: 0.6 K/UL — SIGNIFICANT CHANGE UP (ref 0–0.9)
MONOCYTES NFR BLD AUTO: 11.2 % — SIGNIFICANT CHANGE UP (ref 2–14)
NEUTROPHILS # BLD AUTO: 3.5 K/UL — SIGNIFICANT CHANGE UP (ref 1.8–7.4)
NEUTROPHILS NFR BLD AUTO: 60.2 % — SIGNIFICANT CHANGE UP (ref 43–77)
PLAT MORPH BLD: (no result)
PLATELET # BLD AUTO: 390 K/UL — SIGNIFICANT CHANGE UP (ref 150–400)
POTASSIUM SERPL-MCNC: 3.6 MMOL/L — SIGNIFICANT CHANGE UP (ref 3.5–5.3)
POTASSIUM SERPL-SCNC: 3.6 MMOL/L — SIGNIFICANT CHANGE UP (ref 3.5–5.3)
RBC # BLD: 4.05 M/UL — SIGNIFICANT CHANGE UP (ref 3.8–5.2)
RBC # FLD: 12.8 % — SIGNIFICANT CHANGE UP (ref 10.3–14.5)
RBC BLD AUTO: NORMAL — SIGNIFICANT CHANGE UP
SODIUM SERPL-SCNC: 140 MMOL/L — SIGNIFICANT CHANGE UP (ref 135–145)
WBC # BLD: 5.7 K/UL — SIGNIFICANT CHANGE UP (ref 3.8–10.5)
WBC # FLD AUTO: 5.7 K/UL — SIGNIFICANT CHANGE UP (ref 3.8–10.5)

## 2017-07-22 PROCEDURE — 93010 ELECTROCARDIOGRAM REPORT: CPT

## 2017-07-22 RX ORDER — SODIUM CHLORIDE 9 MG/ML
500 INJECTION INTRAMUSCULAR; INTRAVENOUS; SUBCUTANEOUS
Qty: 0 | Refills: 0 | Status: DISCONTINUED | OUTPATIENT
Start: 2017-07-22 | End: 2017-07-24

## 2017-07-22 RX ADMIN — Medication 81 MILLIGRAM(S): at 11:34

## 2017-07-22 RX ADMIN — CARVEDILOL PHOSPHATE 6.25 MILLIGRAM(S): 80 CAPSULE, EXTENDED RELEASE ORAL at 09:49

## 2017-07-22 RX ADMIN — AMLODIPINE BESYLATE 2.5 MILLIGRAM(S): 2.5 TABLET ORAL at 21:21

## 2017-07-22 RX ADMIN — Medication 20 MILLIGRAM(S): at 06:52

## 2017-07-22 RX ADMIN — HEPARIN SODIUM 5000 UNIT(S): 5000 INJECTION INTRAVENOUS; SUBCUTANEOUS at 17:35

## 2017-07-22 RX ADMIN — CEFTRIAXONE 100 GRAM(S): 500 INJECTION, POWDER, FOR SOLUTION INTRAMUSCULAR; INTRAVENOUS at 17:35

## 2017-07-22 RX ADMIN — HEPARIN SODIUM 5000 UNIT(S): 5000 INJECTION INTRAVENOUS; SUBCUTANEOUS at 06:52

## 2017-07-22 RX ADMIN — Medication 20 MILLIGRAM(S): at 11:34

## 2017-07-22 RX ADMIN — CLOPIDOGREL BISULFATE 75 MILLIGRAM(S): 75 TABLET, FILM COATED ORAL at 11:34

## 2017-07-22 RX ADMIN — SODIUM CHLORIDE 60 MILLILITER(S): 9 INJECTION INTRAMUSCULAR; INTRAVENOUS; SUBCUTANEOUS at 06:52

## 2017-07-22 RX ADMIN — CARVEDILOL PHOSPHATE 6.25 MILLIGRAM(S): 80 CAPSULE, EXTENDED RELEASE ORAL at 21:21

## 2017-07-22 NOTE — PHYSICAL THERAPY INITIAL EVALUATION ADULT - CRITERIA FOR SKILLED THERAPEUTIC INTERVENTIONS
impairments found/anticipated equipment needs at discharge/rehab potential/anticipated discharge recommendation

## 2017-07-22 NOTE — PHYSICAL THERAPY INITIAL EVALUATION ADULT - PERTINENT HX OF CURRENT PROBLEM, REHAB EVAL
89F c/o weakness, SOB, sent in from doctor's office for hypotension to 100/60. Pt's daughter reports pt has been weak for 5 days with some decrease in oral intake.  As per daughter, pt can walk but is off balance and almost fell back.

## 2017-07-22 NOTE — PHYSICAL THERAPY INITIAL EVALUATION ADULT - ADDITIONAL COMMENTS
pt lives in a house with 7 WILLIE with 1 HR, resides on that level, 1 flight down with BHRs to daughter's part of the house.  pt states she has a RW that she does not use, + grab bars in the shower

## 2017-07-22 NOTE — PROGRESS NOTE ADULT - SUBJECTIVE AND OBJECTIVE BOX
History of Present Illness: 	  Pt is a 89 y/o woman w/ PMHx of HTN, CABG, shoulder replacement, positional vertigo, UTI who is  c/o weakness, SOB, sent in from doctor's office for hypotension to 100/60. Pt's daughter reports pt has been weak for 5 days with some decrease in oral intake.  As per daughter, pt can walk but is off balance and almost fall back. Pt denies fever, chills HA, CP.  Pt is on lasix for HTN,  no hx of CHF    :     REVIEW OF SYSTEMS:  General: NAD, hemodynamically stable, (-)  fever, (-) chills, (-) weakness  HEENT:  Eyes:  No visual loss, blurred vision, double vision or yellow sclerae. Ears, Nose, Throat:  No hearing loss, sneezing, congestion, runny nose or sore throat.  SKIN:  No rash or itching.  CARDIOVASCULAR:  No chest pain, chest pressure or chest discomfort. No palpitations or edema.  RESPIRATORY:  No shortness of breath, cough or sputum.  GASTROINTESTINAL:  No anorexia, nausea, vomiting or diarrhea. No abdominal pain or blood.  NEUROLOGICAL:  No headache, dizziness, syncope, paralysis, ataxia, numbness or tingling in the extremities. No change in bowel or bladder control.  MUSCULOSKELETAL:  No muscle, back pain, joint pain or stiffness.  HEMATOLOGIC:  No anemia, bleeding or bruising.  LYMPHATICS:  No enlarged nodes. No history of splenectomy.  ENDOCRINOLOGIC:  No reports of sweating, cold or heat intolerance. No polyuria or polydipsia.  ALLERGIES:  No history of asthma, hives, eczema or rhinitis.    Physical Exam:   GENERAL APPEARANCE:  NAD, hemodynamically stable  T(C): 36.2 (17 @ 05:33), Max: 36.5 (17 @ 14:00)  HR: 62 (17 @ 05:33) (61 - 67)  BP: 148/45 (17 @ 05:33) (146/52 - 164/60)  RR: 18 (17 @ 05:33) (16 - 18)  SpO2: 100% (17 @ 05:33) (96% - 100%)  HEENT:  Head is normocephalic    Skin:  Warm and dry without any rash   NECK:  Supple without lymphadenopathy.   HEART:  Regular rate and rhythm. normal S1 and S2, No M/R/G  LUNGS:  Good ins/exp effort, no W/R/R/C  ABDOMEN:  Soft, nontender, nondistended with good bowel sounds heard  EXTREMITIES:  Without cyanosis, clubbing or edema.   NEUROLOGICAL:  Gross nonfocal       CBC Full  -  ( 2017 06:52 )  WBC Count : 5.7 K/uL  Hemoglobin : 12.5 g/dL  Hematocrit : 36.1 %  Platelet Count - Automated : 390 K/uL    Urinalysis Basic - ( 2017 16:04 )    Color: Yellow / Appearance: x / S.020 / pH: x  Gluc: x / Ketone: Negative  / Bili: Negative / Urobili: 1 mg/dL   Blood: x / Protein: 15 mg/dL / Nitrite: Negative   Leuk Esterase: Moderate / RBC: 0-2 /HPF / WBC 11-25   Sq Epi: x / Non Sq Epi: Few / Bacteria: Many      07-    140  |  106  |  19  ----------------------------<  84  3.6   |  28  |  0.76    Ca    8.5      2017 06:52    TPro  6.2  /  Alb  3.5  /  TBili  0.6  /  DBili  x   /  AST  20  /  ALT  20  /  AlkPhos  58  07-21      # Urinary tract infection / hypotension / dehydration   - UCx / Blood Cx  - IV hydration  - Rocephin day # 1  - hold lasix    # CABG  - cont statin, ASA, plavix, Coreg    # Weakness  - ambulate with assistance, falls risk  - Physical therapy , pt may need a walker    # DVT prophylaxis  - heparin History of Present Illness: 	  Pt is a 89 y/o woman w/ PMHx of HTN, CABG, shoulder replacement, positional vertigo, UTI who is  c/o weakness, SOB, sent in from doctor's office for hypotension to 100/60. Pt's daughter reports pt has been weak for 5 days with some decrease in oral intake.  As per daughter, pt can walk but is off balance and almost fall back. Pt denies fever, chills HA, CP.  Pt is on lasix for HTN,  no hx of CHF    : Seen and eval. hemodynamically stable. Denies any HA, CP, SOB. Notes of urinary frequency but patient attributes it to (lasix that she takes), no fevers, chills or shakes. Labs and vitals reviewed.     REVIEW OF SYSTEMS:  General: NAD, hemodynamically stable   HEENT:  no visual changes  SKIN:  dry skin  CARDIOVASCULAR:  No chest pain   RESPIRATORY:  No shortness of breath, cough or sputum.  GASTROINTESTINAL:  No anorexia, nausea, vomiting or diarrhea. No abdominal pain or blood.  NEUROLOGICAL:  No headache, dizziness, syncope, paralysis, ataxia, numbness or tingling in the extremities. No change in bowel or bladder control.  MUSCULOSKELETAL:  dry skin, chronic lower extremity skin changes  LYMPHATICS:  No enlarged nodes. No history of splenectomy.  ENDOCRINOLOGIC:  No reports of sweating, cold or heat intolerance. No polyuria or polydipsia.  ALLERGIES:  No history of asthma, hives, eczema or rhinitis.    Physical Exam:   GENERAL APPEARANCE:  NAD, hemodynamically stable, elderly, frail  T(C): 36.2 (17 @ 05:33), Max: 36.5 (17 @ 14:00)  HR: 62 (17 @ 05:33) (61 - 67)  BP: 148/45 (17 @ 05:33) (146/52 - 164/60)  RR: 18 (17 @ 05:33) (16 - 18)  SpO2: 100% (17 @ 05:33) (96% - 100%)  HEENT:  Head is normocephalic    Skin:  dry skin  NECK:  Supple without lymphadenopathy.   HEART:  Regular rate and rhythm. normal S1 and S2, No M/R/G  LUNGS:  Good ins/exp effort, no W/R/R/C  ABDOMEN:  Soft, nontender, nondistended with good bowel sounds heard  EXTREMITIES:  Without cyanosis, clubbing or edema.   NEUROLOGICAL:  Gross nonfocal       CBC Full  -  ( 2017 06:52 )  WBC Count : 5.7 K/uL  Hemoglobin : 12.5 g/dL  Hematocrit : 36.1 %  Platelet Count - Automated : 390 K/uL    Urinalysis Basic - ( 2017 16:04 )    Color: Yellow / Appearance: x / S.020 / pH: x  Gluc: x / Ketone: Negative  / Bili: Negative / Urobili: 1 mg/dL   Blood: x / Protein: 15 mg/dL / Nitrite: Negative   Leuk Esterase: Moderate / RBC: 0-2 /HPF / WBC 11-25   Sq Epi: x / Non Sq Epi: Few / Bacteria: Many      -    140  |  106  |  19  ----------------------------<  84  3.6   |  28  |  0.76    Ca    8.5      2017 06:52    TPro  6.2  /  Alb  3.5  /  TBili  0.6  /  DBili  x   /  AST  20  /  ALT  20  /  AlkPhos  58  07-21      # Urinary tract infection / hypotension / dehydration   - UCx / Blood Cx  - IV hydration  - Rocephin day # 1  - hold lasix    # CABG  - cont statin, ASA, plavix, Coreg    # Weakness  - ambulate with assistance, falls risk  - Physical therapy , pt may need a walker    # DVT prophylaxis  - heparin History of Present Illness: 	  Pt is a 89 y/o woman w/ PMHx of HTN, CABG, shoulder replacement, positional vertigo, UTI who is  c/o weakness, SOB, sent in from doctor's office for hypotension to 100/60. Pt's daughter reports pt has been weak for 5 days with some decrease in oral intake.  As per daughter, pt can walk but is off balance and almost fall back. Pt denies fever, chills HA, CP.  Pt is on lasix for HTN,  no hx of CHF    : Seen and eval. hemodynamically stable. Denies any HA, CP, SOB. Notes of urinary frequency but patient attributes it to (lasix that she takes), no fevers, chills or shakes. Labs and vitals reviewed. Neck pain - f/u with Dr. Novak,  pending CT scan.     REVIEW OF SYSTEMS:  General: NAD, hemodynamically stable   HEENT: neck pain  SKIN:  dry skin  CARDIOVASCULAR:  No chest pain   RESPIRATORY:  No shortness of breath, cough or sputum.  GASTROINTESTINAL:  No anorexia, nausea, vomiting or diarrhea. No abdominal pain or blood.  NEUROLOGICAL:  No headache, dizziness, syncope, paralysis, ataxia, numbness or tingling in the extremities. No change in bowel or bladder control.  MUSCULOSKELETAL:  dry skin, chronic lower extremity skin changes  LYMPHATICS:  No enlarged nodes. No history of splenectomy.  ENDOCRINOLOGIC:  No reports of sweating, cold or heat intolerance. No polyuria or polydipsia.  ALLERGIES:  No history of asthma, hives, eczema or rhinitis.    Physical Exam:   GENERAL APPEARANCE:  NAD, hemodynamically stable, elderly, frail  T(C): 36.2 (17 @ 05:33), Max: 36.5 (17 @ 14:00)  HR: 62 (17 @ 05:33) (61 - 67)  BP: 148/45 (17 @ 05:33) (146/52 - 164/60)  RR: 18 (17 @ 05:33) (16 - 18)  SpO2: 100% (17 @ 05:33) (96% - 100%)  HEENT:  Head is normocephalic    Skin:  dry skin  NECK:  Supple without lymphadenopathy.   HEART:  Regular rate and rhythm. normal S1 and S2, No M/R/G  LUNGS:  Good ins/exp effort, no W/R/R/C  ABDOMEN:  Soft, nontender, nondistended with good bowel sounds heard  EXTREMITIES:  Without cyanosis, clubbing or edema.   NEUROLOGICAL:  Gross nonfocal       CBC Full  -  ( 2017 06:52 )  WBC Count : 5.7 K/uL  Hemoglobin : 12.5 g/dL  Hematocrit : 36.1 %  Platelet Count - Automated : 390 K/uL    Urinalysis Basic - ( 2017 16:04 )    Color: Yellow / Appearance: x / S.020 / pH: x  Gluc: x / Ketone: Negative  / Bili: Negative / Urobili: 1 mg/dL   Blood: x / Protein: 15 mg/dL / Nitrite: Negative   Leuk Esterase: Moderate / RBC: 0-2 /HPF / WBC 11-25   Sq Epi: x / Non Sq Epi: Few / Bacteria: Many      -    140  |  106  |  19  ----------------------------<  84  3.6   |  28  |  0.76    Ca    8.5      2017 06:52    TPro  6.2  /  Alb  3.5  /  TBili  0.6  /  DBili  x   /  AST  20  /  ALT  20  /  AlkPhos  58  07-21      # Urinary tract infection / hypotension / dehydration   - UCx / Blood Cx  - IV hydration  - Rocephin day # 1  - hold lasix    # CABG  - cont statin, ASA, plavix, Coreg    # Weakness  - ambulate with assistance, falls risk  - Physical therapy , pt may need a walker    # DVT prophylaxis  - heparin

## 2017-07-23 RX ADMIN — Medication 81 MILLIGRAM(S): at 09:52

## 2017-07-23 RX ADMIN — CARVEDILOL PHOSPHATE 6.25 MILLIGRAM(S): 80 CAPSULE, EXTENDED RELEASE ORAL at 09:52

## 2017-07-23 RX ADMIN — CLOPIDOGREL BISULFATE 75 MILLIGRAM(S): 75 TABLET, FILM COATED ORAL at 09:52

## 2017-07-23 RX ADMIN — AMLODIPINE BESYLATE 2.5 MILLIGRAM(S): 2.5 TABLET ORAL at 21:06

## 2017-07-23 RX ADMIN — Medication 20 MILLIGRAM(S): at 09:52

## 2017-07-23 RX ADMIN — CARVEDILOL PHOSPHATE 6.25 MILLIGRAM(S): 80 CAPSULE, EXTENDED RELEASE ORAL at 21:06

## 2017-07-23 RX ADMIN — CEFTRIAXONE 100 GRAM(S): 500 INJECTION, POWDER, FOR SOLUTION INTRAMUSCULAR; INTRAVENOUS at 18:17

## 2017-07-23 RX ADMIN — HEPARIN SODIUM 5000 UNIT(S): 5000 INJECTION INTRAVENOUS; SUBCUTANEOUS at 05:57

## 2017-07-23 RX ADMIN — HEPARIN SODIUM 5000 UNIT(S): 5000 INJECTION INTRAVENOUS; SUBCUTANEOUS at 18:17

## 2017-07-23 NOTE — PROGRESS NOTE ADULT - SUBJECTIVE AND OBJECTIVE BOX
History of Present Illness: 	  Pt is a 89 y/o woman w/ PMHx of HTN, CABG, shoulder replacement, positional vertigo, UTI who is  c/o weakness, SOB, sent in from doctor's office for hypotension to 100/60. Pt's daughter reports pt has been weak for 5 days with some decrease in oral intake.  As per daughter, pt can walk but is off balance and almost fall back. Pt denies fever, chills HA, CP.  Pt is on lasix for HTN,  no hx of CHF    : Seen and eval. hemodynamically stable. Denies any HA, CP, SOB. Notes of urinary frequency but patient attributes it to (lasix that she takes), no fevers, chills or shakes. Labs and vitals reviewed.     : seen and eval. hemodynamically stable. Denies any HA, CP, SOB. No fevers, chills or shakes. Anticipated D/C tomorrow.     REVIEW OF SYSTEMS:  General: NAD, hemodynamically stable   HEENT:  no visual changes  SKIN:  dry skin  CARDIOVASCULAR:  No chest pain   RESPIRATORY:  No shortness of breath, cough or sputum.  GASTROINTESTINAL:  No anorexia, nausea, vomiting or diarrhea. No abdominal pain or blood.  NEUROLOGICAL:  No headache, dizziness, syncope, paralysis, ataxia, numbness or tingling in the extremities. No change in bowel or bladder control.  MUSCULOSKELETAL:  dry skin, chronic lower extremity skin changes  ENDOCRINOLOGIC:  No reports of sweating, cold or heat intolerance. No polyuria or polydipsia.    Physical Exam:   GENERAL APPEARANCE:  NAD, hemodynamically stable, elderly, frail  ICU Vital Signs Last 24 Hrs  T(C): 36.9 (2017 09:58), Max: 36.9 (2017 09:58)  T(F): 98.4 (2017 09:58), Max: 98.4 (2017 09:58)  HR: 70 (2017 09:58) (62 - 70)  BP: 147/64 (2017 09:58) (147/64 - 163/61)  RR: 18 (2017 09:58) (17 - 18)  SpO2: 95% (2017 09:58) (95% - 97%)    HEENT:  Head is normocephalic    Skin:  dry skin  NECK:  Supple without lymphadenopathy.   HEART:  Regular rate and rhythm. normal S1 and S2, No M/R/G  LUNGS:  Good ins/exp effort, no W/R/R/C  ABDOMEN:  Soft, nontender, nondistended with good bowel sounds heard  EXTREMITIES:  Without cyanosis, clubbing or edema.   NEUROLOGICAL:  Gross nonfocal         CBC Full  -  ( 2017 06:52 )  WBC Count : 5.7 K/uL  Hemoglobin : 12.5 g/dL  Hematocrit : 36.1 %  Platelet Count - Automated : 390 K/uL  Mean Cell Volume : 89.2 fl  Mean Cell Hemoglobin : 30.9 pg  Mean Cell Hemoglobin Concentration : 34.6 gm/dL  Auto Neutrophil # : 3.5 K/uL  Auto Lymphocyte # : 1.5 K/uL  Auto Monocyte # : 0.6 K/uL  Auto Eosinophil # : 0.1 K/uL  Auto Basophil # : 0.1 K/uL  Auto Neutrophil % : 60.2 %  Auto Lymphocyte % : 26.0 %  Auto Monocyte % : 11.2 %  Auto Eosinophil % : 1.5 %  Auto Basophil % : 1.2 %      Urinalysis Basic - ( 2017 16:04 )    Color: Yellow / Appearance: x / S.020 / pH: x  Gluc: x / Ketone: Negative  / Bili: Negative / Urobili: 1 mg/dL   Blood: x / Protein: 15 mg/dL / Nitrite: Negative   Leuk Esterase: Moderate / RBC: 0-2 /HPF / WBC 11-25   Sq Epi: x / Non Sq Epi: Few / Bacteria: Many      -    140  |  106  |  19  ----------------------------<  84  3.6   |  28  |  0.76    Ca    8.5      2017 06:52    TPro  6.2  /  Alb  3.5  /  TBili  0.6  /  DBili  x   /  AST  20  /  ALT  20  /  AlkPhos  58  07-21        # Urinary tract infection / hypotension / dehydration   - UCx / Blood Cx  - IV hydration  - Rocephin day # 2  - hold lasix    # CABG  - cont statin, ASA, plavix, Coreg    # Weakness  - ambulate with assistance, falls risk  - Physical therapy , pt may need a walker    # DVT prophylaxis  - heparin History of Present Illness: 	  Pt is a 89 y/o woman w/ PMHx of HTN, CABG, shoulder replacement, positional vertigo, UTI who is  c/o weakness, SOB, sent in from doctor's office for hypotension to 100/60. Pt's daughter reports pt has been weak for 5 days with some decrease in oral intake.  As per daughter, pt can walk but is off balance and almost fall back. Pt denies fever, chills HA, CP.  Pt is on lasix for HTN,  no hx of CHF    7/22: Seen and eval. hemodynamically stable. Denies any HA, CP, SOB. Notes of urinary frequency but patient attributes it to (lasix that she takes), no fevers, chills or shakes. Labs and vitals reviewed.     7/23: seen and eval. hemodynamically stable. Denies any HA, CP, SOB. No fevers, chills or shakes. Anticipated D/C tomorrow.     7/24: Seen and eval. hemodynamically stable. Denies any HA, CP, SOB. No fevers, chills or shakes. patient eager to go home. care discussed with patient's daughter. Neck pain also relayed to the daughter - appears pain has been there for past 2 years. Labs and vitals reviewed. D/C planning. Dr. Cardenas is not available. Will follow up with .     IMPRESSION:   No vertebral fracture is recognized.  Multilevel   degenerative disc disease and spondylosis at C2-3 through C6-7 with loss   of disc height and associated degenerative endplate changes. There is   narrowing of the BILATERAL C5-6 and C6/7 neural foramina due to   uncovertebral spurring.            REVIEW OF SYSTEMS:  General: NAD, hemodynamically stable   HEENT:  no visual changes  SKIN:  dry skin  CARDIOVASCULAR:  No chest pain   RESPIRATORY:  No shortness of breath, cough or sputum.  GASTROINTESTINAL:  No anorexia, nausea, vomiting or diarrhea. No abdominal pain or blood.  NEUROLOGICAL:  No headache, dizziness, syncope, paralysis, ataxia, numbness or tingling in the extremities. No change in bowel or bladder control.  MUSCULOSKELETAL:  dry skin, chronic lower extremity skin changes  ENDOCRINOLOGIC:  No reports of sweating, cold or heat intolerance. No polyuria or polydipsia.    Physical Exam:   ICU Vital Signs Last 24 Hrs  T(C): 36.6 (24 Jul 2017 13:04), Max: 36.7 (23 Jul 2017 17:10)  T(F): 97.8 (24 Jul 2017 13:04), Max: 98 (23 Jul 2017 17:10)  HR: 66 (24 Jul 2017 13:04) (61 - 66)  BP: 152/61 (24 Jul 2017 13:04) (147/53 - 184/55)  RR: 17 (24 Jul 2017 13:04) (16 - 18)  SpO2: 94% (24 Jul 2017 13:04) (94% - 98%)  HEENT:  Head is normocephalic    Skin:  dry skin  NECK:  Supple without lymphadenopathy.   HEART:  Regular rate and rhythm. normal S1 and S2, No M/R/G  LUNGS:  Good ins/exp effort, no W/R/R/C  ABDOMEN:  Soft, nontender, nondistended with good bowel sounds heard  EXTREMITIES:  Without cyanosis, clubbing or edema.   NEUROLOGICAL:  Gross nonfocal         # Urinary tract infection / hypotension / dehydration   - UCx / Blood Cx  - IV hydration  - Rocephin day # 3  - hold lasix    # CABG  - cont statin, ASA, plavix, Coreg    # Weakness  - ambulate with assistance, falls risk  - Physical therapy , pt may need a walker    # DVT prophylaxis  - heparin

## 2017-07-24 ENCOUNTER — TRANSCRIPTION ENCOUNTER (OUTPATIENT)
Age: 82
End: 2017-07-24

## 2017-07-24 VITALS
TEMPERATURE: 98 F | DIASTOLIC BLOOD PRESSURE: 61 MMHG | SYSTOLIC BLOOD PRESSURE: 152 MMHG | HEART RATE: 66 BPM | OXYGEN SATURATION: 94 % | RESPIRATION RATE: 17 BRPM

## 2017-07-24 PROCEDURE — 72125 CT NECK SPINE W/O DYE: CPT | Mod: 26

## 2017-07-24 RX ORDER — FUROSEMIDE 40 MG
20 TABLET ORAL DAILY
Qty: 0 | Refills: 0 | Status: DISCONTINUED | OUTPATIENT
Start: 2017-07-24 | End: 2017-07-24

## 2017-07-24 RX ADMIN — CARVEDILOL PHOSPHATE 6.25 MILLIGRAM(S): 80 CAPSULE, EXTENDED RELEASE ORAL at 09:02

## 2017-07-24 RX ADMIN — Medication 20 MILLIGRAM(S): at 13:13

## 2017-07-24 RX ADMIN — Medication 81 MILLIGRAM(S): at 13:13

## 2017-07-24 RX ADMIN — HEPARIN SODIUM 5000 UNIT(S): 5000 INJECTION INTRAVENOUS; SUBCUTANEOUS at 06:39

## 2017-07-24 RX ADMIN — CLOPIDOGREL BISULFATE 75 MILLIGRAM(S): 75 TABLET, FILM COATED ORAL at 13:13

## 2017-07-24 RX ADMIN — Medication 20 MILLIGRAM(S): at 10:11

## 2017-07-24 NOTE — DISCHARGE NOTE ADULT - HOSPITAL COURSE
Pt is a 89 y/o woman w/ PMHx of HTN, CABG, shoulder replacement, positional vertigo, UTI who is  c/o weakness, SOB, sent in from doctor's office for hypotension to 100/60. Pt's daughter reports pt has been weak for 5 days with some decrease in oral intake.  As per daughter, pt can walk but is off balance and almost fall back. Pt denies fever, chills HA, CP.  Pt is on lasix for HTN,  no hx of CHF. Hemodynamically stable. elevated BP this am, now stable. CT scan of the neck IMPRESSION:   No vertebral fracture is recognized.  Multilevel   degenerative disc disease and spondylosis at C2-3 through C6-7 with loss of disc height and associated degenerative endplate changes. There is narrowing of the BILATERAL C5-6 and C6/7 neural foramina due to   uncovertebral spurring.           Explained to patient and patient's daughter they should follow up with PCP within 1 week. Called patient's PCP and left a message with c/b number regarding hospital course and discharge.

## 2017-07-24 NOTE — DISCHARGE NOTE ADULT - MEDICATION SUMMARY - MEDICATIONS TO TAKE
I will START or STAY ON the medications listed below when I get home from the hospital:    Aspirin Low Dose 81 mg oral delayed release tablet  -- 1 tab(s) by mouth once a day  -- Indication: For -    Paxil 20 mg oral tablet  -- 1 tab(s) by mouth once a day  -- Indication: For antidepressant    Plavix 75 mg oral tablet  -- 1 tab(s) by mouth once a day  -- Indication: For blood thinner    Coreg 6.25 mg oral tablet  -- 1 tab(s) by mouth 2 times a day  -- Indication: For for blood pressure    Norvasc 2.5 mg oral tablet  -- 1 tab(s) by mouth once a day (at bedtime)  -- Indication: For for blood pressure    Lasix 20 mg oral tablet  -- 1 tab(s) by mouth once a day  -- Indication: For for blood pressure

## 2017-07-24 NOTE — DISCHARGE NOTE ADULT - CARE PLAN
Principal Discharge DX:	Weakness  Goal:	Follow up with PCP within 1 week.  Instructions for follow-up, activity and diet:	Follow up with PCP within 1 week. Principal Discharge DX:	Weakness  Goal:	Follow up with PCP within 1 week.  Instructions for follow-up, activity and diet:	Follow up with PCP within 1 week.  Secondary Diagnosis:	UTI (urinary tract infection)  Instructions for follow-up, activity and diet:	- full course of antibiotics with rocephin  Secondary Diagnosis:	CAD (coronary artery disease)  Instructions for follow-up, activity and diet:	continue with aspirin/plavix /coreg

## 2017-07-24 NOTE — DISCHARGE NOTE ADULT - CARE PROVIDERS DIRECT ADDRESSES
[email or web address]

## 2017-07-24 NOTE — DISCHARGE NOTE ADULT - CARE PROVIDER_API CALL
Parveen Louise (MD), Cardiovascular Disease  [address] [place] [address]  Phone: [phone]  Fax: [phone]    Marta Fraser), Internal Medicine  [address] [place] [address]  Phone: [phone]  Fax: [phone]

## 2017-07-24 NOTE — DISCHARGE NOTE ADULT - PLAN OF CARE
Follow up with PCP within 1 week. - full course of antibiotics with rocephin continue with aspirin/plavix /coreg

## 2017-07-24 NOTE — PROGRESS NOTE ADULT - SUBJECTIVE AND OBJECTIVE BOX
History of Present Illness: 	  Pt is a 89 y/o woman w/ PMHx of HTN, CABG, shoulder replacement, positional vertigo, UTI who is  c/o weakness, SOB, sent in from doctor's office for hypotension to 100/60. Pt's daughter reports pt has been weak for 5 days with some decrease in oral intake.  As per daughter, pt can walk but is off balance and almost fall back. Pt denies fever, chills HA, CP.  Pt is on lasix for HTN,  no hx of CHF    7/22: Seen and eval. hemodynamically stable. Denies any HA, CP, SOB. Notes of urinary frequency but patient attributes it to (lasix that she takes), no fevers, chills or shakes. Labs and vitals reviewed.     7/24: seen and eval. Hemodynamically stable. elevated BP this am. CT scan of the neck pending.     REVIEW OF SYSTEMS:  General: NAD, hemodynamically stable   HEENT:  neck discomfort  SKIN:  dry skin  CARDIOVASCULAR:  No chest pain   RESPIRATORY:  No shortness of breath, cough or sputum.  GASTROINTESTINAL:  No anorexia, nausea, vomiting or diarrhea. No abdominal pain or blood.  NEUROLOGICAL:  No headache, dizziness, syncope, paralysis, ataxia, numbness or tingling in the extremities. No change in bowel or bladder control.  MUSCULOSKELETAL:  dry skin, chronic lower extremity skin changes  ENDOCRINOLOGIC:  No reports of sweating, cold or heat intolerance. No polyuria or polydipsia.    Physical Exam:   GENERAL APPEARANCE:  NAD, hemodynamically stable, elderly, frail  ICU Vital Signs Last 24 Hrs  T(C): 36.3 (24 Jul 2017 04:15), Max: 36.9 (23 Jul 2017 09:58)  T(F): 97.4 (24 Jul 2017 04:15), Max: 98.4 (23 Jul 2017 09:58)  HR: 64 (24 Jul 2017 08:55) (61 - 70)  BP: 184/55 (24 Jul 2017 08:55) (147/53 - 184/55)  RR: 16 (24 Jul 2017 04:15) (16 - 18)  SpO2: 96% (24 Jul 2017 08:55) (95% - 98%)  HEENT:  Head is normocephalic    Skin:  dry skin  NECK:  Supple without lymphadenopathy.   HEART:  Regular rate and rhythm. normal S1 and S2, No M/R/G  LUNGS:  Good ins/exp effort, no W/R/R/C  ABDOMEN:  Soft, nontender, nondistended with good bowel sounds heard  EXTREMITIES:  Without cyanosis, clubbing or edema.   NEUROLOGICAL:  Gross nonfocal       # Urinary tract infection / hypotension / dehydration   - UCx / Blood Cx  - IV hydration  - Rocephin day # 2  - hold lasix    # CABG  - cont statin, ASA, plavix, Coreg    # Weakness  - ambulate with assistance, falls risk  - Physical therapy , pt may need a walker    # Neck pain  - pending CT scan  - patient indicates that she has had a neck discomfort -  2 years. Followed up with Dr. Novak gabriel did not find anything as per patient    # DVT prophylaxis  - heparin

## 2017-07-24 NOTE — DISCHARGE NOTE ADULT - PATIENT PORTAL LINK FT
“You can access the [facility] Patient Portal, offered by [facility], by registering with the following website: [email or web address]”

## 2017-07-27 DIAGNOSIS — N39.0 URINARY TRACT INFECTION, SITE NOT SPECIFIED: ICD-10-CM

## 2017-07-27 DIAGNOSIS — R53.1 WEAKNESS: ICD-10-CM

## 2017-07-27 DIAGNOSIS — Z79.82 LONG TERM (CURRENT) USE OF ASPIRIN: ICD-10-CM

## 2017-07-27 DIAGNOSIS — M50.31 OTHER CERVICAL DISC DEGENERATION, HIGH CERVICAL REGION: ICD-10-CM

## 2017-07-27 DIAGNOSIS — I95.9 HYPOTENSION, UNSPECIFIED: ICD-10-CM

## 2017-07-27 DIAGNOSIS — M50.323 OTHER CERVICAL DISC DEGENERATION AT C6-C7 LEVEL: ICD-10-CM

## 2017-07-27 DIAGNOSIS — M47.892 OTHER SPONDYLOSIS, CERVICAL REGION: ICD-10-CM

## 2017-07-27 DIAGNOSIS — E86.0 DEHYDRATION: ICD-10-CM

## 2017-07-27 DIAGNOSIS — Z95.1 PRESENCE OF AORTOCORONARY BYPASS GRAFT: ICD-10-CM

## 2017-07-27 DIAGNOSIS — Z79.899 OTHER LONG TERM (CURRENT) DRUG THERAPY: ICD-10-CM

## 2017-07-27 DIAGNOSIS — I10 ESSENTIAL (PRIMARY) HYPERTENSION: ICD-10-CM

## 2017-09-15 ENCOUNTER — RX RENEWAL (OUTPATIENT)
Age: 82
End: 2017-09-15

## 2017-10-02 ENCOUNTER — RX RENEWAL (OUTPATIENT)
Age: 82
End: 2017-10-02

## 2017-10-03 ENCOUNTER — NON-APPOINTMENT (OUTPATIENT)
Age: 82
End: 2017-10-03

## 2017-10-03 ENCOUNTER — APPOINTMENT (OUTPATIENT)
Dept: CARDIOLOGY | Facility: CLINIC | Age: 82
End: 2017-10-03
Payer: MEDICARE

## 2017-10-03 VITALS
OXYGEN SATURATION: 98 % | HEIGHT: 63 IN | TEMPERATURE: 97.8 F | WEIGHT: 107 LBS | SYSTOLIC BLOOD PRESSURE: 193 MMHG | HEART RATE: 66 BPM | DIASTOLIC BLOOD PRESSURE: 74 MMHG | BODY MASS INDEX: 18.96 KG/M2

## 2017-10-03 DIAGNOSIS — Z00.00 ENCOUNTER FOR GENERAL ADULT MEDICAL EXAMINATION W/OUT ABNORMAL FINDINGS: ICD-10-CM

## 2017-10-03 PROCEDURE — 99214 OFFICE O/P EST MOD 30 MIN: CPT | Mod: 25

## 2017-10-03 PROCEDURE — 93000 ELECTROCARDIOGRAM COMPLETE: CPT

## 2017-10-03 RX ORDER — PNEUMOCOCCAL 13-VALENT CONJUGATE VACCINE 2.2; 2.2; 2.2; 2.2; 2.2; 4.4; 2.2; 2.2; 2.2; 2.2; 2.2; 2.2; 2.2 UG/.5ML; UG/.5ML; UG/.5ML; UG/.5ML; UG/.5ML; UG/.5ML; UG/.5ML; UG/.5ML; UG/.5ML; UG/.5ML; UG/.5ML; UG/.5ML; UG/.5ML
INJECTION, SUSPENSION INTRAMUSCULAR
Qty: 1 | Refills: 0 | Status: ACTIVE | COMMUNITY
Start: 2017-10-03 | End: 1900-01-01

## 2017-12-04 ENCOUNTER — RX RENEWAL (OUTPATIENT)
Age: 82
End: 2017-12-04

## 2017-12-04 ENCOUNTER — MEDICATION RENEWAL (OUTPATIENT)
Age: 82
End: 2017-12-04

## 2018-01-06 ENCOUNTER — INPATIENT (INPATIENT)
Facility: HOSPITAL | Age: 83
LOS: 3 days | Discharge: HOME | End: 2018-01-10
Attending: INTERNAL MEDICINE | Admitting: INTERNAL MEDICINE
Payer: MEDICARE

## 2018-01-06 VITALS
WEIGHT: 139.99 LBS | TEMPERATURE: 97 F | HEIGHT: 64 IN | SYSTOLIC BLOOD PRESSURE: 235 MMHG | RESPIRATION RATE: 18 BRPM | DIASTOLIC BLOOD PRESSURE: 112 MMHG | OXYGEN SATURATION: 99 % | HEART RATE: 91 BPM

## 2018-01-06 LAB
ALBUMIN SERPL ELPH-MCNC: 3.2 G/DL — LOW (ref 3.3–5)
ALP SERPL-CCNC: 58 U/L — SIGNIFICANT CHANGE UP (ref 40–120)
ALT FLD-CCNC: 27 U/L — SIGNIFICANT CHANGE UP (ref 12–78)
ANION GAP SERPL CALC-SCNC: 7 MMOL/L — SIGNIFICANT CHANGE UP (ref 5–17)
APTT BLD: 35.7 SEC — SIGNIFICANT CHANGE UP (ref 27.5–37.4)
AST SERPL-CCNC: 37 U/L — SIGNIFICANT CHANGE UP (ref 15–37)
BASOPHILS # BLD AUTO: 0.1 K/UL — SIGNIFICANT CHANGE UP (ref 0–0.2)
BASOPHILS NFR BLD AUTO: 0.9 % — SIGNIFICANT CHANGE UP (ref 0–2)
BILIRUB SERPL-MCNC: 0.5 MG/DL — SIGNIFICANT CHANGE UP (ref 0.2–1.2)
BUN SERPL-MCNC: 27 MG/DL — HIGH (ref 7–23)
CALCIUM SERPL-MCNC: 8.3 MG/DL — LOW (ref 8.5–10.1)
CHLORIDE SERPL-SCNC: 103 MMOL/L — SIGNIFICANT CHANGE UP (ref 96–108)
CO2 SERPL-SCNC: 29 MMOL/L — SIGNIFICANT CHANGE UP (ref 22–31)
CREAT SERPL-MCNC: 0.9 MG/DL — SIGNIFICANT CHANGE UP (ref 0.5–1.3)
EOSINOPHIL # BLD AUTO: 0.1 K/UL — SIGNIFICANT CHANGE UP (ref 0–0.5)
EOSINOPHIL NFR BLD AUTO: 0.6 % — SIGNIFICANT CHANGE UP (ref 0–6)
GLUCOSE SERPL-MCNC: 136 MG/DL — HIGH (ref 70–99)
HCT VFR BLD CALC: 32 % — LOW (ref 34.5–45)
HCT VFR BLD CALC: 34.1 % — LOW (ref 34.5–45)
HGB BLD-MCNC: 10.3 G/DL — LOW (ref 11.5–15.5)
HGB BLD-MCNC: 11.5 G/DL — SIGNIFICANT CHANGE UP (ref 11.5–15.5)
INR BLD: 1.31 RATIO — HIGH (ref 0.88–1.16)
LYMPHOCYTES # BLD AUTO: 13.1 % — SIGNIFICANT CHANGE UP (ref 13–44)
LYMPHOCYTES # BLD AUTO: 2 K/UL — SIGNIFICANT CHANGE UP (ref 1–3.3)
MCHC RBC-ENTMCNC: 29 PG — SIGNIFICANT CHANGE UP (ref 27–34)
MCHC RBC-ENTMCNC: 30.3 PG — SIGNIFICANT CHANGE UP (ref 27–34)
MCHC RBC-ENTMCNC: 32.2 GM/DL — SIGNIFICANT CHANGE UP (ref 32–36)
MCHC RBC-ENTMCNC: 33.7 GM/DL — SIGNIFICANT CHANGE UP (ref 32–36)
MCV RBC AUTO: 90.1 FL — SIGNIFICANT CHANGE UP (ref 80–100)
MCV RBC AUTO: 90.1 FL — SIGNIFICANT CHANGE UP (ref 80–100)
MONOCYTES # BLD AUTO: 0.7 K/UL — SIGNIFICANT CHANGE UP (ref 0–0.9)
MONOCYTES NFR BLD AUTO: 4.5 % — SIGNIFICANT CHANGE UP (ref 2–14)
NEUTROPHILS # BLD AUTO: 12.3 K/UL — HIGH (ref 1.8–7.4)
NEUTROPHILS NFR BLD AUTO: 81 % — HIGH (ref 43–77)
PLATELET # BLD AUTO: 605 K/UL — HIGH (ref 150–400)
PLATELET # BLD AUTO: 761 K/UL — HIGH (ref 150–400)
POTASSIUM SERPL-MCNC: 3.6 MMOL/L — SIGNIFICANT CHANGE UP (ref 3.5–5.3)
POTASSIUM SERPL-SCNC: 3.6 MMOL/L — SIGNIFICANT CHANGE UP (ref 3.5–5.3)
PROT SERPL-MCNC: 6.3 GM/DL — SIGNIFICANT CHANGE UP (ref 6–8.3)
PROTHROM AB SERPL-ACNC: 14.2 SEC — HIGH (ref 9.8–12.7)
RBC # BLD: 3.55 M/UL — LOW (ref 3.8–5.2)
RBC # BLD: 3.78 M/UL — LOW (ref 3.8–5.2)
RBC # FLD: 13.7 % — SIGNIFICANT CHANGE UP (ref 10.3–14.5)
RBC # FLD: 14 % — SIGNIFICANT CHANGE UP (ref 10.3–14.5)
SODIUM SERPL-SCNC: 139 MMOL/L — SIGNIFICANT CHANGE UP (ref 135–145)
WBC # BLD: 15.2 K/UL — HIGH (ref 3.8–10.5)
WBC # BLD: 19.7 K/UL — HIGH (ref 3.8–10.5)
WBC # FLD AUTO: 15.2 K/UL — HIGH (ref 3.8–10.5)
WBC # FLD AUTO: 19.7 K/UL — HIGH (ref 3.8–10.5)

## 2018-01-06 PROCEDURE — 72125 CT NECK SPINE W/O DYE: CPT | Mod: 26

## 2018-01-06 PROCEDURE — 70450 CT HEAD/BRAIN W/O DYE: CPT | Mod: 26

## 2018-01-06 PROCEDURE — 71045 X-RAY EXAM CHEST 1 VIEW: CPT | Mod: 26

## 2018-01-06 PROCEDURE — 72190 X-RAY EXAM OF PELVIS: CPT | Mod: 26

## 2018-01-06 PROCEDURE — 93010 ELECTROCARDIOGRAM REPORT: CPT

## 2018-01-06 RX ORDER — ACETAMINOPHEN 500 MG
650 TABLET ORAL ONCE
Qty: 0 | Refills: 0 | Status: COMPLETED | OUTPATIENT
Start: 2018-01-06 | End: 2018-01-06

## 2018-01-06 RX ORDER — TETANUS TOXOID, REDUCED DIPHTHERIA TOXOID AND ACELLULAR PERTUSSIS VACCINE, ADSORBED 5; 2.5; 8; 8; 2.5 [IU]/.5ML; [IU]/.5ML; UG/.5ML; UG/.5ML; UG/.5ML
0.5 SUSPENSION INTRAMUSCULAR ONCE
Qty: 0 | Refills: 0 | Status: COMPLETED | OUTPATIENT
Start: 2018-01-06 | End: 2018-01-06

## 2018-01-06 RX ADMIN — Medication 650 MILLIGRAM(S): at 21:39

## 2018-01-06 RX ADMIN — TETANUS TOXOID, REDUCED DIPHTHERIA TOXOID AND ACELLULAR PERTUSSIS VACCINE, ADSORBED 0.5 MILLILITER(S): 5; 2.5; 8; 8; 2.5 SUSPENSION INTRAMUSCULAR at 21:38

## 2018-01-06 NOTE — ED ADULT NURSE NOTE - OBJECTIVE STATEMENT
pt fell at home , hit head, has posterior lac to head , reports head, neck , rt shoulder, rt hip pain . pt has LLE swelling and ecchymosis

## 2018-01-07 LAB
APPEARANCE UR: (no result)
BACTERIA # UR AUTO: (no result)
BILIRUB UR-MCNC: NEGATIVE — SIGNIFICANT CHANGE UP
COLOR SPEC: YELLOW — SIGNIFICANT CHANGE UP
DIFF PNL FLD: (no result)
EPI CELLS # UR: SIGNIFICANT CHANGE UP
GLUCOSE UR QL: NEGATIVE MG/DL — SIGNIFICANT CHANGE UP
KETONES UR-MCNC: (no result)
LEUKOCYTE ESTERASE UR-ACNC: (no result)
NITRITE UR-MCNC: POSITIVE
PH UR: 6 — SIGNIFICANT CHANGE UP (ref 5–8)
PROT UR-MCNC: 30 MG/DL
RBC CASTS # UR COMP ASSIST: (no result) /HPF (ref 0–4)
SP GR SPEC: 1.02 — SIGNIFICANT CHANGE UP (ref 1.01–1.02)
UROBILINOGEN FLD QL: 1 MG/DL
WBC UR QL: SIGNIFICANT CHANGE UP

## 2018-01-07 PROCEDURE — 12001 RPR S/N/AX/GEN/TRNK 2.5CM/<: CPT

## 2018-01-07 PROCEDURE — 99285 EMERGENCY DEPT VISIT HI MDM: CPT | Mod: 25

## 2018-01-07 RX ORDER — SENNA PLUS 8.6 MG/1
2 TABLET ORAL AT BEDTIME
Qty: 0 | Refills: 0 | Status: DISCONTINUED | OUTPATIENT
Start: 2018-01-07 | End: 2018-01-10

## 2018-01-07 RX ORDER — CEFTRIAXONE 500 MG/1
1 INJECTION, POWDER, FOR SOLUTION INTRAMUSCULAR; INTRAVENOUS EVERY 24 HOURS
Qty: 0 | Refills: 0 | Status: DISCONTINUED | OUTPATIENT
Start: 2018-01-07 | End: 2018-01-07

## 2018-01-07 RX ORDER — ONDANSETRON 8 MG/1
4 TABLET, FILM COATED ORAL EVERY 6 HOURS
Qty: 0 | Refills: 0 | Status: DISCONTINUED | OUTPATIENT
Start: 2018-01-07 | End: 2018-01-10

## 2018-01-07 RX ORDER — CLOPIDOGREL BISULFATE 75 MG/1
75 TABLET, FILM COATED ORAL DAILY
Qty: 0 | Refills: 0 | Status: DISCONTINUED | OUTPATIENT
Start: 2018-01-07 | End: 2018-01-10

## 2018-01-07 RX ORDER — ASPIRIN/CALCIUM CARB/MAGNESIUM 324 MG
81 TABLET ORAL DAILY
Qty: 0 | Refills: 0 | Status: DISCONTINUED | OUTPATIENT
Start: 2018-01-07 | End: 2018-01-10

## 2018-01-07 RX ORDER — AMLODIPINE BESYLATE 2.5 MG/1
2.5 TABLET ORAL AT BEDTIME
Qty: 0 | Refills: 0 | Status: DISCONTINUED | OUTPATIENT
Start: 2018-01-07 | End: 2018-01-10

## 2018-01-07 RX ORDER — CARVEDILOL PHOSPHATE 80 MG/1
6.25 CAPSULE, EXTENDED RELEASE ORAL EVERY 12 HOURS
Qty: 0 | Refills: 0 | Status: DISCONTINUED | OUTPATIENT
Start: 2018-01-07 | End: 2018-01-10

## 2018-01-07 RX ORDER — HEPARIN SODIUM 5000 [USP'U]/ML
5000 INJECTION INTRAVENOUS; SUBCUTANEOUS EVERY 12 HOURS
Qty: 0 | Refills: 0 | Status: DISCONTINUED | OUTPATIENT
Start: 2018-01-07 | End: 2018-01-10

## 2018-01-07 RX ORDER — CEFTRIAXONE 500 MG/1
1000 INJECTION, POWDER, FOR SOLUTION INTRAMUSCULAR; INTRAVENOUS ONCE
Qty: 0 | Refills: 0 | Status: COMPLETED | OUTPATIENT
Start: 2018-01-07 | End: 2018-01-07

## 2018-01-07 RX ORDER — SODIUM CHLORIDE 9 MG/ML
500 INJECTION INTRAMUSCULAR; INTRAVENOUS; SUBCUTANEOUS ONCE
Qty: 0 | Refills: 0 | Status: COMPLETED | OUTPATIENT
Start: 2018-01-07 | End: 2018-01-07

## 2018-01-07 RX ORDER — CEFTRIAXONE 500 MG/1
1 INJECTION, POWDER, FOR SOLUTION INTRAMUSCULAR; INTRAVENOUS ONCE
Qty: 0 | Refills: 0 | Status: DISCONTINUED | OUTPATIENT
Start: 2018-01-07 | End: 2018-01-07

## 2018-01-07 RX ORDER — FUROSEMIDE 40 MG
1 TABLET ORAL
Qty: 0 | Refills: 0 | COMMUNITY

## 2018-01-07 RX ORDER — CEFTRIAXONE 500 MG/1
1000 INJECTION, POWDER, FOR SOLUTION INTRAMUSCULAR; INTRAVENOUS EVERY 24 HOURS
Qty: 0 | Refills: 0 | Status: DISCONTINUED | OUTPATIENT
Start: 2018-01-07 | End: 2018-01-08

## 2018-01-07 RX ADMIN — Medication 81 MILLIGRAM(S): at 13:54

## 2018-01-07 RX ADMIN — SODIUM CHLORIDE 500 MILLILITER(S): 9 INJECTION INTRAMUSCULAR; INTRAVENOUS; SUBCUTANEOUS at 00:37

## 2018-01-07 RX ADMIN — CEFTRIAXONE 1000 MILLIGRAM(S): 500 INJECTION, POWDER, FOR SOLUTION INTRAMUSCULAR; INTRAVENOUS at 02:26

## 2018-01-07 RX ADMIN — CEFTRIAXONE 1000 MILLIGRAM(S): 500 INJECTION, POWDER, FOR SOLUTION INTRAMUSCULAR; INTRAVENOUS at 10:20

## 2018-01-07 RX ADMIN — Medication 20 MILLIGRAM(S): at 13:54

## 2018-01-07 RX ADMIN — CARVEDILOL PHOSPHATE 6.25 MILLIGRAM(S): 80 CAPSULE, EXTENDED RELEASE ORAL at 21:18

## 2018-01-07 RX ADMIN — CLOPIDOGREL BISULFATE 75 MILLIGRAM(S): 75 TABLET, FILM COATED ORAL at 13:54

## 2018-01-07 RX ADMIN — HEPARIN SODIUM 5000 UNIT(S): 5000 INJECTION INTRAVENOUS; SUBCUTANEOUS at 21:20

## 2018-01-07 RX ADMIN — AMLODIPINE BESYLATE 2.5 MILLIGRAM(S): 2.5 TABLET ORAL at 21:19

## 2018-01-07 RX ADMIN — Medication 1 TABLET(S): at 13:54

## 2018-01-07 NOTE — H&P ADULT - NSHPPHYSICALEXAM_GEN_ALL_CORE
T(C): 36.7 (01-07-18 @ 03:52), Max: 36.7 (01-07-18 @ 03:52)  HR: 80 (01-07-18 @ 06:30) (80 - 91)  BP: 119/56 (01-07-18 @ 06:30) (119/56 - 235/112)  RR: 16 (01-07-18 @ 06:30) (16 - 18)  SpO2: 100% (01-07-18 @ 06:30) (98% - 100%)  Wt(kg): --      Gen: AAOx3, NAD  HEENT: NCAT, EOMI  Neck: Supple  CV: nml S1S2, RRR  Lungs: CTABL  Abd: Soft, NT, ND, BS+  Ext: No edema  Neuro: Non focal

## 2018-01-07 NOTE — ED PROVIDER NOTE - OBJECTIVE STATEMENT
Pt comes into ED after a unwitnessed fall. Family state that she has balance issues. Unknown last tdap. + lac to head. Daughter noted blood all over floor.  Pt states she was walking to answer the phone and fell. No chest pain, dizziness or sob.

## 2018-01-07 NOTE — ED ADULT NURSE REASSESSMENT NOTE - NS ED NURSE REASSESS COMMENT FT1
Received report from Flory CARRANZA. See trauma flowsheet.
Patient now admitted for UTI. See Trauma flowsheet for previous charting. Resting comfortably, ate sandwhich. No complaints. VS stable. Awaiting admission orders and bed placement. Updated on plan of care. Will continue to monitor.
Pt medicated per orders, breakfast was ordered (she had been sleeping), daughter given update.
Pt sleeping.
Pt states she's fine, sleeping. Diet ordered.
Report given to Dee Dee CARRANZA
Resting comfortably, patient and linens changed. Vs stable. No complaints at this time. Awaiting admission orders and bed placement. Updated on plan of care.
Patient received from TERE Gregg. Patient resting comfortably in bed. Safety and comfort maintained. Will continue to monitor.
Received pt in Peds 3. Awake alert calm for confused . IV site clear . Neuro checks stable . will monitor report called to ED holding will monitor till transport . HS meds given safety is being maintained .

## 2018-01-07 NOTE — H&P ADULT - NSHPLABSRESULTS_GEN_ALL_CORE
10.3   19.7  )-----------( 605      ( 2018 22:11 )             32.0     2018 18:59    139    |  103    |  27     ----------------------------<  136    3.6     |  29     |  0.90     Ca    8.3        2018 18:59    TPro  6.3    /  Alb  3.2    /  TBili  0.5    /  DBili  x      /  AST  37     /  ALT  27     /  AlkPhos  58     2018 18:59    PT/INR - ( 2018 18:59 )   PT: 14.2 sec;   INR: 1.31 ratio         PTT - ( 2018 18:59 )  PTT:35.7 sec  CAPILLARY BLOOD GLUCOSE        LIVER FUNCTIONS - ( 2018 18:59 )  Alb: 3.2 g/dL / Pro: 6.3 gm/dL / ALK PHOS: 58 U/L / ALT: 27 U/L / AST: 37 U/L / GGT: x           Urinalysis Basic - ( 2018 23:51 )    Color: Yellow / Appearance: Slightly Turbid / S.020 / pH: x  Gluc: x / Ketone: Trace  / Bili: Negative / Urobili: 1 mg/dL   Blood: x / Protein: 30 mg/dL / Nitrite: Positive   Leuk Esterase: Trace / RBC: 3-5 /HPF / WBC 0-2   Sq Epi: x / Non Sq Epi: Few / Bacteria: Moderate

## 2018-01-07 NOTE — H&P ADULT - NSHPREVIEWOFSYSTEMS_GEN_ALL_CORE
(-)Fever, chills, cough, chest pain, sob, headache, dizziness, palpitations, abd pain, n/v/d, leg swelling  (+)FALL

## 2018-01-07 NOTE — PATIENT PROFILE ADULT. - ASSIST WITH
Zarxio    Pre-Injection Assessment: Vital signs entered., Allergies assessed as required for injection type., Pt states feeling well, no complaints. and Pt denies signs and symptoms of infection.  Refer to MAR (medication administration record) for type of injection and medication given.  Needle Size: 27 g. 1/2\"  Patient tolerated well: Stable and Follow up appointment scheduled     Dr. Jackelyn Denson is supervising provider today.       standing/walking

## 2018-01-07 NOTE — H&P ADULT - HISTORY OF PRESENT ILLNESS
Patient is 89 yo female with PMhx of HTN, CAD s/p CAG, vertigo, hx of recurrent UTIs, presented s/p fall. Pt reports she was going to  her telephone, when she suddenly felt dizzy and fell onto the floor hitting her head, sustaining a laceration. Pt denies cp, sob, palpitations, HA. NO other constitutional symptoms. History of previous recurrent falls in the past./

## 2018-01-07 NOTE — ED PROVIDER NOTE - CARE PLAN
Principal Discharge DX:	UTI (urinary tract infection)  Secondary Diagnosis:	Leukocytosis  Secondary Diagnosis:	Scalp laceration

## 2018-01-08 LAB
ANION GAP SERPL CALC-SCNC: 6 MMOL/L — SIGNIFICANT CHANGE UP (ref 5–17)
BUN SERPL-MCNC: 28 MG/DL — HIGH (ref 7–23)
CALCIUM SERPL-MCNC: 8.1 MG/DL — LOW (ref 8.5–10.1)
CHLORIDE SERPL-SCNC: 105 MMOL/L — SIGNIFICANT CHANGE UP (ref 96–108)
CO2 SERPL-SCNC: 28 MMOL/L — SIGNIFICANT CHANGE UP (ref 22–31)
CREAT SERPL-MCNC: 0.64 MG/DL — SIGNIFICANT CHANGE UP (ref 0.5–1.3)
CULTURE RESULTS: SIGNIFICANT CHANGE UP
GLUCOSE SERPL-MCNC: 104 MG/DL — HIGH (ref 70–99)
HCT VFR BLD CALC: 24.1 % — LOW (ref 34.5–45)
HGB BLD-MCNC: 8.1 G/DL — LOW (ref 11.5–15.5)
MCHC RBC-ENTMCNC: 30.4 PG — SIGNIFICANT CHANGE UP (ref 27–34)
MCHC RBC-ENTMCNC: 33.7 GM/DL — SIGNIFICANT CHANGE UP (ref 32–36)
MCV RBC AUTO: 90.2 FL — SIGNIFICANT CHANGE UP (ref 80–100)
PLATELET # BLD AUTO: 528 K/UL — HIGH (ref 150–400)
POTASSIUM SERPL-MCNC: 3.5 MMOL/L — SIGNIFICANT CHANGE UP (ref 3.5–5.3)
POTASSIUM SERPL-SCNC: 3.5 MMOL/L — SIGNIFICANT CHANGE UP (ref 3.5–5.3)
RBC # BLD: 2.68 M/UL — LOW (ref 3.8–5.2)
RBC # FLD: 14 % — SIGNIFICANT CHANGE UP (ref 10.3–14.5)
SODIUM SERPL-SCNC: 139 MMOL/L — SIGNIFICANT CHANGE UP (ref 135–145)
SPECIMEN SOURCE: SIGNIFICANT CHANGE UP
WBC # BLD: 9.5 K/UL — SIGNIFICANT CHANGE UP (ref 3.8–10.5)
WBC # FLD AUTO: 9.5 K/UL — SIGNIFICANT CHANGE UP (ref 3.8–10.5)

## 2018-01-08 RX ADMIN — CARVEDILOL PHOSPHATE 6.25 MILLIGRAM(S): 80 CAPSULE, EXTENDED RELEASE ORAL at 17:38

## 2018-01-08 RX ADMIN — HEPARIN SODIUM 5000 UNIT(S): 5000 INJECTION INTRAVENOUS; SUBCUTANEOUS at 17:38

## 2018-01-08 RX ADMIN — AMLODIPINE BESYLATE 2.5 MILLIGRAM(S): 2.5 TABLET ORAL at 21:33

## 2018-01-08 RX ADMIN — Medication 1 TABLET(S): at 11:01

## 2018-01-08 RX ADMIN — CARVEDILOL PHOSPHATE 6.25 MILLIGRAM(S): 80 CAPSULE, EXTENDED RELEASE ORAL at 05:54

## 2018-01-08 RX ADMIN — CEFTRIAXONE 1000 MILLIGRAM(S): 500 INJECTION, POWDER, FOR SOLUTION INTRAMUSCULAR; INTRAVENOUS at 11:01

## 2018-01-08 RX ADMIN — HEPARIN SODIUM 5000 UNIT(S): 5000 INJECTION INTRAVENOUS; SUBCUTANEOUS at 05:54

## 2018-01-08 RX ADMIN — Medication 20 MILLIGRAM(S): at 11:02

## 2018-01-08 RX ADMIN — Medication 81 MILLIGRAM(S): at 11:02

## 2018-01-08 RX ADMIN — CLOPIDOGREL BISULFATE 75 MILLIGRAM(S): 75 TABLET, FILM COATED ORAL at 11:02

## 2018-01-08 NOTE — PROGRESS NOTE ADULT - SUBJECTIVE AND OBJECTIVE BOX
Patient is 89 yo female with PMhx of HTN, CAD s/p CABG, vertigo, hx of recurrent UTIs, presented s/p fall. Pt reports she was going to  her telephone, when she suddenly felt dizzy and fell onto the floor hitting her head, sustaining a laceration.    1/8: gen weakness drop in     Vital Signs Last 24 Hrs  T(C): 36.7 (08 Jan 2018 11:20), Max: 37.1 (08 Jan 2018 05:20)  T(F): 98 (08 Jan 2018 11:20), Max: 98.7 (08 Jan 2018 05:20)  HR: 80 (08 Jan 2018 11:20) (80 - 89)  BP: 132/47 (08 Jan 2018 11:20) (126/50 - 158/58)  BP(mean): --  RR: 18 (08 Jan 2018 11:20) (16 - 18)  SpO2: 96% (08 Jan 2018 11:20) (95% - 96%)      Gen: AAOx3, NAD  	HEENT: NCAT, EOMI  	Neck: Supple  	CV: nml S1S2, RRR  	Lungs: CTABL  	Abd: Soft, NT, ND, BS+  	Ext: No edema              Neuro: Non focal but with gen wekaness, poss right foot drop      Fall  - currently afebrile, HD stable, comfortable, in NAD, non focal neurological exam  - CT head C spine negative for acute pathological findings  - PT eval    * Anemia: repeat cbc in am    # CAD s/p CABG  - c/w ASA, Plavix, Coreg

## 2018-01-09 ENCOUNTER — APPOINTMENT (OUTPATIENT)
Dept: CARDIOLOGY | Facility: CLINIC | Age: 83
End: 2018-01-09

## 2018-01-09 LAB
HCT VFR BLD CALC: 24.5 % — LOW (ref 34.5–45)
HGB BLD-MCNC: 8.3 G/DL — LOW (ref 11.5–15.5)
MCHC RBC-ENTMCNC: 31 PG — SIGNIFICANT CHANGE UP (ref 27–34)
MCHC RBC-ENTMCNC: 33.8 GM/DL — SIGNIFICANT CHANGE UP (ref 32–36)
MCV RBC AUTO: 91.7 FL — SIGNIFICANT CHANGE UP (ref 80–100)
PLATELET # BLD AUTO: 548 K/UL — HIGH (ref 150–400)
RBC # BLD: 2.67 M/UL — LOW (ref 3.8–5.2)
RBC # FLD: 14.2 % — SIGNIFICANT CHANGE UP (ref 10.3–14.5)
WBC # BLD: 9.1 K/UL — SIGNIFICANT CHANGE UP (ref 3.8–10.5)
WBC # FLD AUTO: 9.1 K/UL — SIGNIFICANT CHANGE UP (ref 3.8–10.5)

## 2018-01-09 PROCEDURE — 74176 CT ABD & PELVIS W/O CONTRAST: CPT | Mod: 26

## 2018-01-09 RX ADMIN — Medication 81 MILLIGRAM(S): at 12:52

## 2018-01-09 RX ADMIN — Medication 1 TABLET(S): at 12:52

## 2018-01-09 RX ADMIN — CARVEDILOL PHOSPHATE 6.25 MILLIGRAM(S): 80 CAPSULE, EXTENDED RELEASE ORAL at 18:23

## 2018-01-09 RX ADMIN — CLOPIDOGREL BISULFATE 75 MILLIGRAM(S): 75 TABLET, FILM COATED ORAL at 12:52

## 2018-01-09 RX ADMIN — CARVEDILOL PHOSPHATE 6.25 MILLIGRAM(S): 80 CAPSULE, EXTENDED RELEASE ORAL at 05:29

## 2018-01-09 RX ADMIN — HEPARIN SODIUM 5000 UNIT(S): 5000 INJECTION INTRAVENOUS; SUBCUTANEOUS at 05:29

## 2018-01-09 RX ADMIN — Medication 20 MILLIGRAM(S): at 12:52

## 2018-01-09 RX ADMIN — AMLODIPINE BESYLATE 2.5 MILLIGRAM(S): 2.5 TABLET ORAL at 21:23

## 2018-01-09 RX ADMIN — HEPARIN SODIUM 5000 UNIT(S): 5000 INJECTION INTRAVENOUS; SUBCUTANEOUS at 18:23

## 2018-01-09 NOTE — PHYSICAL THERAPY INITIAL EVALUATION ADULT - MODALITIES TREATMENT COMMENTS
soft/nontender... pt left seated in chair post Eval; chair alarm donned; RN Shalonda present; callbell in reach; pt instructed not to get up alone; call nursing for assist; janet well; denied pain

## 2018-01-09 NOTE — PROGRESS NOTE ADULT - SUBJECTIVE AND OBJECTIVE BOX
Patient is 89 yo female with PMhx of HTN, CAD s/p CABG, vertigo, hx of recurrent UTIs, presented s/p fall. Pt reports she was going to  her telephone, when she suddenly felt dizzy and fell onto the floor hitting her head, sustaining a laceration.    1/8: gen weakness drop in hh  1/9: pleasant, no c/o persistent anemia    Vital Signs Last 24 Hrs  T(C): 36.3 (09 Jan 2018 12:04), Max: 37.1 (08 Jan 2018 17:30)  T(F): 97.3 (09 Jan 2018 12:04), Max: 98.7 (08 Jan 2018 17:30)  HR: 74 (09 Jan 2018 12:04) (74 - 84)  BP: 118/42 (09 Jan 2018 12:04) (118/42 - 160/57)  BP(mean): --  RR: 17 (09 Jan 2018 12:04) (17 - 18)  SpO2: 95% (09 Jan 2018 12:04) (95% - 96%)      Gen: AAOx3, NAD  	HEENT: NCAT, EOMI  	Neck: Supple  	CV: nml S1S2, RRR  	Lungs: CTABL  	Abd: Soft, NT, ND, BS+  	Ext: No edema              Neuro: Non focal but with gen weakness  poss right foot drop      Fall  - currently afebrile, HD stable, comfortable, in NAD, non focal neurological exam  - PT eval    * Anemia: repeat cbc in am  - check CT abd and pelvis r/o bleed after fall; pt on DAP    # CAD s/p CABG  - c/w ASA, Plavix, Coreg

## 2018-01-09 NOTE — PHYSICAL THERAPY INITIAL EVALUATION ADULT - PHYSICAL ASSIST/NONPHYSICAL ASSIST: GAIT, REHAB EVAL
verbal/tactile cues, to widen ELY, for erect posture, sequencing, & proper use of RW/1 person assist

## 2018-01-09 NOTE — PROGRESS NOTE ADULT - SUBJECTIVE AND OBJECTIVE BOX
Patient is 89 yo female with PMhx of HTN, CAD s/p CABG, vertigo, hx of recurrent UTIs, presented s/p fall. Pt reports she was going to  her telephone, when she suddenly felt dizzy and fell onto the floor hitting her head, sustaining a laceration.    1/8: gen weakness drop in hh  1/9: pleasant, no c/o persistent anemia    Vital Signs Last 24 Hrs  T(C): 36.3 (09 Jan 2018 12:04), Max: 37.1 (08 Jan 2018 17:30)  T(F): 97.3 (09 Jan 2018 12:04), Max: 98.7 (08 Jan 2018 17:30)  HR: 74 (09 Jan 2018 12:04) (74 - 84)  BP: 118/42 (09 Jan 2018 12:04) (118/42 - 160/57)  BP(mean): --  RR: 17 (09 Jan 2018 12:04) (17 - 18)  SpO2: 95% (09 Jan 2018 12:04) (95% - 96%)      Gen: AAOx3, NAD  	HEENT: NCAT, EOMI  	Neck: Supple  	CV: nml S1S2, RRR  	Lungs: CTABL  	Abd: Soft, NT, ND, BS+  	Ext: No edema              Neuro: Non focal but with gen weakness  poss right foot drop      Fall  - currently afebrile, HD stable, comfortable, in NAD, non focal neurological exam  - PT eval    * Anemia: repeat cbc in am  - check CT abd and pelvis r/o bleed after fall; pt on DAP    # CAD s/p CABG  - c/w ASA, Plavix, Coreg

## 2018-01-10 ENCOUNTER — TRANSCRIPTION ENCOUNTER (OUTPATIENT)
Age: 83
End: 2018-01-10

## 2018-01-10 VITALS
DIASTOLIC BLOOD PRESSURE: 43 MMHG | TEMPERATURE: 99 F | HEART RATE: 74 BPM | RESPIRATION RATE: 16 BRPM | OXYGEN SATURATION: 96 % | SYSTOLIC BLOOD PRESSURE: 102 MMHG

## 2018-01-10 LAB
HCT VFR BLD CALC: 23.9 % — LOW (ref 34.5–45)
HGB BLD-MCNC: 8 G/DL — LOW (ref 11.5–15.5)
MCHC RBC-ENTMCNC: 30.6 PG — SIGNIFICANT CHANGE UP (ref 27–34)
MCHC RBC-ENTMCNC: 33.4 GM/DL — SIGNIFICANT CHANGE UP (ref 32–36)
MCV RBC AUTO: 91.9 FL — SIGNIFICANT CHANGE UP (ref 80–100)
PLATELET # BLD AUTO: 516 K/UL — HIGH (ref 150–400)
RBC # BLD: 2.6 M/UL — LOW (ref 3.8–5.2)
RBC # FLD: 14.5 % — SIGNIFICANT CHANGE UP (ref 10.3–14.5)
WBC # BLD: 7.6 K/UL — SIGNIFICANT CHANGE UP (ref 3.8–10.5)
WBC # FLD AUTO: 7.6 K/UL — SIGNIFICANT CHANGE UP (ref 3.8–10.5)

## 2018-01-10 RX ORDER — FERROUS SULFATE 325(65) MG
1 TABLET ORAL
Qty: 0 | Refills: 0 | COMMUNITY
Start: 2018-01-10

## 2018-01-10 RX ORDER — POLYETHYLENE GLYCOL 3350 17 G/17G
17 POWDER, FOR SOLUTION ORAL DAILY
Qty: 0 | Refills: 0 | Status: DISCONTINUED | OUTPATIENT
Start: 2018-01-10 | End: 2018-01-10

## 2018-01-10 RX ORDER — FERROUS SULFATE 325(65) MG
325 TABLET ORAL DAILY
Qty: 0 | Refills: 0 | Status: DISCONTINUED | OUTPATIENT
Start: 2018-01-10 | End: 2018-01-10

## 2018-01-10 RX ORDER — SENNA PLUS 8.6 MG/1
2 TABLET ORAL
Qty: 0 | Refills: 0 | COMMUNITY
Start: 2018-01-10

## 2018-01-10 RX ORDER — POLYETHYLENE GLYCOL 3350 17 G/17G
17 POWDER, FOR SOLUTION ORAL
Qty: 0 | Refills: 0 | COMMUNITY
Start: 2018-01-10

## 2018-01-10 RX ADMIN — Medication 20 MILLIGRAM(S): at 11:21

## 2018-01-10 RX ADMIN — Medication 81 MILLIGRAM(S): at 11:20

## 2018-01-10 RX ADMIN — Medication 1 TABLET(S): at 11:21

## 2018-01-10 RX ADMIN — HEPARIN SODIUM 5000 UNIT(S): 5000 INJECTION INTRAVENOUS; SUBCUTANEOUS at 05:38

## 2018-01-10 RX ADMIN — CARVEDILOL PHOSPHATE 6.25 MILLIGRAM(S): 80 CAPSULE, EXTENDED RELEASE ORAL at 05:38

## 2018-01-10 RX ADMIN — CLOPIDOGREL BISULFATE 75 MILLIGRAM(S): 75 TABLET, FILM COATED ORAL at 11:21

## 2018-01-10 NOTE — DISCHARGE NOTE ADULT - CARE PROVIDERS DIRECT ADDRESSES
[email or web address]

## 2018-01-10 NOTE — DISCHARGE NOTE ADULT - PLAN OF CARE
stable same as before stable at Hb 8-8.5 probab sec to fall, small hematoma right glut; repeat cbc at Rehab in 48 hrs and weekly address accordingly; pt is on DAP Rx

## 2018-01-10 NOTE — DISCHARGE NOTE ADULT - HOSPITAL COURSE
Patient is 89 yo female with PMhx of HTN, CAD s/p CABG, vertigo, hx of recurrent UTIs, presented s/p fall. Pt reports she was going to  her telephone, when she suddenly felt dizzy and fell onto the floor hitting her head, sustaining a laceration.    Gen: AAOx3, NAD  	HEENT: NCAT, EOMI  	Neck: Supple  	CV: nml S1S2, RRR  	Lungs: CTABL  	Abd: Soft, NT, ND, BS+  	Ext: No edema              Neuro: Non focal but with gen weakness  poss right foot drop      Fall  - currently afebrile, HD stable, comfortable, in NAD, non focal neurological exam  - PT eval    * Anemia: repeat cbc in am  - small bleed after fall; pt on DAP; repeat cbc at rehab start iron fam aware; pt had BM but i will have her on a regimen at rehab; scalp staples to be remove in a week    # CAD s/p CABG  - c/w ASA, Plavix, Coreg

## 2018-01-10 NOTE — DISCHARGE NOTE ADULT - CARE PROVIDER_API CALL
Marta Fraser), Internal Medicine  [address] [place] [address]  Phone: [phone]  Fax: [phone]

## 2018-01-10 NOTE — DISCHARGE NOTE ADULT - MEDICATION SUMMARY - MEDICATIONS TO TAKE
I will START or STAY ON the medications listed below when I get home from the hospital:    Aspirin Low Dose 81 mg oral delayed release tablet  -- 1 tab(s) by mouth once a day  -- Indication: For .    Paxil 20 mg oral tablet  -- 1 tab(s) by mouth once a day  -- Indication: For .    Plavix 75 mg oral tablet  -- 1 tab(s) by mouth once a day  -- Indication: For .    Coreg 6.25 mg oral tablet  -- 1 tab(s) by mouth 2 times a day  -- Indication: For .    Norvasc 2.5 mg oral tablet  -- 1 tab(s) by mouth once a day (at bedtime)  -- Indication: For .    ferrous sulfate 325 mg (65 mg elemental iron) oral delayed release tablet  -- 1 tab(s) by mouth once a day  -- Indication: For .    polyethylene glycol 3350 oral powder for reconstitution  -- 17 gram(s) by mouth once a day  -- Indication: For .    senna oral tablet  -- 2 tab(s) by mouth once a day (at bedtime), As needed, Constipation  -- Indication: For .    Multiple Vitamins oral tablet  -- 1 tab(s) by mouth once a day  -- Indication: For .

## 2018-01-10 NOTE — DISCHARGE NOTE ADULT - CARE PLAN
Principal Discharge DX:	Scalp laceration  Goal:	stable  Instructions for follow-up, activity and diet:	same as before  Secondary Diagnosis:	Anemia  Goal:	stable at Hb 8-8.5  Instructions for follow-up, activity and diet:	probab sec to fall, small hematoma right glut; repeat cbc at Rehab in 48 hrs and weekly address accordingly; pt is on DAP Rx

## 2018-01-10 NOTE — DISCHARGE NOTE ADULT - PATIENT PORTAL LINK FT
“You can access the [employer] Patient Portal, offered by [facility], by registering with the following website: [email or web address]”
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
## 2018-01-16 DIAGNOSIS — D64.9 ANEMIA, UNSPECIFIED: ICD-10-CM

## 2018-01-16 DIAGNOSIS — I10 ESSENTIAL (PRIMARY) HYPERTENSION: ICD-10-CM

## 2018-01-16 DIAGNOSIS — I25.10 ATHEROSCLEROTIC HEART DISEASE OF NATIVE CORONARY ARTERY WITHOUT ANGINA PECTORIS: ICD-10-CM

## 2018-01-16 DIAGNOSIS — S01.01XA LACERATION WITHOUT FOREIGN BODY OF SCALP, INITIAL ENCOUNTER: ICD-10-CM
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
## 2018-01-16 DIAGNOSIS — N39.0 URINARY TRACT INFECTION, SITE NOT SPECIFIED: ICD-10-CM

## 2018-01-16 DIAGNOSIS — Z95.1 PRESENCE OF AORTOCORONARY BYPASS GRAFT: ICD-10-CM

## 2018-02-09 ENCOUNTER — NON-APPOINTMENT (OUTPATIENT)
Age: 83
End: 2018-02-09

## 2018-02-09 ENCOUNTER — APPOINTMENT (OUTPATIENT)
Dept: CARDIOLOGY | Facility: CLINIC | Age: 83
End: 2018-02-09
Payer: MEDICARE

## 2018-02-09 VITALS
HEART RATE: 73 BPM | BODY MASS INDEX: 19.67 KG/M2 | TEMPERATURE: 98.1 F | SYSTOLIC BLOOD PRESSURE: 168 MMHG | HEIGHT: 63 IN | DIASTOLIC BLOOD PRESSURE: 78 MMHG | OXYGEN SATURATION: 98 % | WEIGHT: 111 LBS

## 2018-02-09 DIAGNOSIS — Z86.2 PERSONAL HISTORY OF DISEASES OF THE BLOOD AND BLOOD-FORMING ORGANS AND CERTAIN DISORDERS INVOLVING THE IMMUNE MECHANISM: ICD-10-CM

## 2018-02-09 PROCEDURE — 99215 OFFICE O/P EST HI 40 MIN: CPT | Mod: 25

## 2018-02-09 PROCEDURE — 93000 ELECTROCARDIOGRAM COMPLETE: CPT

## 2018-02-09 RX ORDER — LOSARTAN POTASSIUM 25 MG/1
25 TABLET, FILM COATED ORAL DAILY
Qty: 90 | Refills: 1 | Status: DISCONTINUED | COMMUNITY
Start: 2017-03-07 | End: 2018-02-09

## 2018-02-13 ENCOUNTER — OTHER (OUTPATIENT)
Age: 83
End: 2018-02-13

## 2018-02-19 ENCOUNTER — APPOINTMENT (OUTPATIENT)
Dept: CARDIOLOGY | Facility: CLINIC | Age: 83
End: 2018-02-19
Payer: MEDICARE

## 2018-02-19 PROCEDURE — 36415 COLL VENOUS BLD VENIPUNCTURE: CPT

## 2018-02-20 LAB
ALBUMIN SERPL ELPH-MCNC: 4.7 G/DL
ALP BLD-CCNC: 68 U/L
ALT SERPL-CCNC: 18 U/L
ANION GAP SERPL CALC-SCNC: 13 MMOL/L
AST SERPL-CCNC: 32 U/L
BILIRUB SERPL-MCNC: 0.4 MG/DL
BUN SERPL-MCNC: 26 MG/DL
CALCIUM SERPL-MCNC: 9.7 MG/DL
CHLORIDE SERPL-SCNC: 98 MMOL/L
CO2 SERPL-SCNC: 30 MMOL/L
CREAT SERPL-MCNC: 0.87 MG/DL
GLUCOSE SERPL-MCNC: 81 MG/DL
POTASSIUM SERPL-SCNC: 4.4 MMOL/L
PROT SERPL-MCNC: 7.5 G/DL
SODIUM SERPL-SCNC: 141 MMOL/L

## 2018-03-01 ENCOUNTER — RX RENEWAL (OUTPATIENT)
Age: 83
End: 2018-03-01

## 2018-03-02 ENCOUNTER — MEDICATION RENEWAL (OUTPATIENT)
Age: 83
End: 2018-03-02

## 2018-03-02 ENCOUNTER — RX RENEWAL (OUTPATIENT)
Age: 83
End: 2018-03-02

## 2018-03-06 ENCOUNTER — LABORATORY RESULT (OUTPATIENT)
Age: 83
End: 2018-03-06

## 2018-03-06 ENCOUNTER — NON-APPOINTMENT (OUTPATIENT)
Age: 83
End: 2018-03-06

## 2018-03-06 ENCOUNTER — APPOINTMENT (OUTPATIENT)
Dept: CARDIOLOGY | Facility: CLINIC | Age: 83
End: 2018-03-06
Payer: MEDICARE

## 2018-03-06 VITALS
DIASTOLIC BLOOD PRESSURE: 63 MMHG | RESPIRATION RATE: 14 BRPM | HEART RATE: 76 BPM | TEMPERATURE: 98.1 F | WEIGHT: 109 LBS | HEIGHT: 63 IN | BODY MASS INDEX: 19.31 KG/M2 | OXYGEN SATURATION: 98 % | SYSTOLIC BLOOD PRESSURE: 140 MMHG

## 2018-03-06 VITALS
SYSTOLIC BLOOD PRESSURE: 180 MMHG | BODY MASS INDEX: 19.31 KG/M2 | DIASTOLIC BLOOD PRESSURE: 83 MMHG | OXYGEN SATURATION: 98 % | HEIGHT: 63 IN | WEIGHT: 109 LBS | HEART RATE: 76 BPM

## 2018-03-06 DIAGNOSIS — Z86.39 PERSONAL HISTORY OF OTHER ENDOCRINE, NUTRITIONAL AND METABOLIC DISEASE: ICD-10-CM

## 2018-03-06 DIAGNOSIS — Z86.79 PERSONAL HISTORY OF OTHER DISEASES OF THE CIRCULATORY SYSTEM: ICD-10-CM

## 2018-03-06 DIAGNOSIS — I25.10 ATHEROSCLEROTIC HEART DISEASE OF NATIVE CORONARY ARTERY W/OUT ANGINA PECTORIS: ICD-10-CM

## 2018-03-06 DIAGNOSIS — H81.10 BENIGN PAROXYSMAL VERTIGO, UNSPECIFIED EAR: ICD-10-CM

## 2018-03-06 DIAGNOSIS — I50.33 ACUTE ON CHRONIC DIASTOLIC (CONGESTIVE) HEART FAILURE: ICD-10-CM

## 2018-03-06 DIAGNOSIS — I10 ESSENTIAL (PRIMARY) HYPERTENSION: ICD-10-CM

## 2018-03-06 PROCEDURE — 99214 OFFICE O/P EST MOD 30 MIN: CPT | Mod: 25

## 2018-03-06 PROCEDURE — 93000 ELECTROCARDIOGRAM COMPLETE: CPT

## 2018-03-06 PROCEDURE — 36415 COLL VENOUS BLD VENIPUNCTURE: CPT

## 2018-03-07 LAB
ALBUMIN SERPL ELPH-MCNC: 4.2 G/DL
ALP BLD-CCNC: 63 U/L
ALT SERPL-CCNC: 16 U/L
ANION GAP SERPL CALC-SCNC: 14 MMOL/L
AST SERPL-CCNC: 33 U/L
BASOPHILS # BLD AUTO: 0.07 K/UL
BASOPHILS NFR BLD AUTO: 0.8 %
BILIRUB SERPL-MCNC: 0.3 MG/DL
BUN SERPL-MCNC: 31 MG/DL
CALCIUM SERPL-MCNC: 9.4 MG/DL
CHLORIDE SERPL-SCNC: 100 MMOL/L
CO2 SERPL-SCNC: 26 MMOL/L
CREAT SERPL-MCNC: 0.94 MG/DL
EOSINOPHIL # BLD AUTO: 0.1 K/UL
EOSINOPHIL NFR BLD AUTO: 1.2 %
GLUCOSE SERPL-MCNC: 105 MG/DL
HCT VFR BLD CALC: 38 %
HGB BLD-MCNC: 13 G/DL
IMM GRANULOCYTES NFR BLD AUTO: 1.1 %
LYMPHOCYTES # BLD AUTO: 2.01 K/UL
LYMPHOCYTES NFR BLD AUTO: 23.6 %
MAN DIFF?: NORMAL
MCHC RBC-ENTMCNC: 32.7 PG
MCHC RBC-ENTMCNC: 34.2 GM/DL
MCV RBC AUTO: 95.5 FL
MONOCYTES # BLD AUTO: 0.76 K/UL
MONOCYTES NFR BLD AUTO: 8.9 %
NEUTROPHILS # BLD AUTO: 5.49 K/UL
NEUTROPHILS NFR BLD AUTO: 64.4 %
PLATELET # BLD AUTO: 633 K/UL
POTASSIUM SERPL-SCNC: 4.6 MMOL/L
PROT SERPL-MCNC: 7.2 G/DL
RBC # BLD: 3.98 M/UL
RBC # FLD: 15.1 %
SODIUM SERPL-SCNC: 140 MMOL/L
WBC # FLD AUTO: 8.52 K/UL

## 2018-03-16 ENCOUNTER — APPOINTMENT (OUTPATIENT)
Dept: CARDIOLOGY | Facility: CLINIC | Age: 83
End: 2018-03-16

## 2018-04-06 ENCOUNTER — MEDICATION RENEWAL (OUTPATIENT)
Age: 83
End: 2018-04-06

## 2018-04-25 ENCOUNTER — MEDICATION RENEWAL (OUTPATIENT)
Age: 83
End: 2018-04-25

## 2018-05-08 ENCOUNTER — APPOINTMENT (OUTPATIENT)
Dept: CARDIOLOGY | Facility: CLINIC | Age: 83
End: 2018-05-08

## 2018-05-15 ENCOUNTER — EMERGENCY (EMERGENCY)
Facility: HOSPITAL | Age: 83
LOS: 0 days | Discharge: HOME | End: 2018-05-15
Attending: EMERGENCY MEDICINE | Admitting: EMERGENCY MEDICINE
Payer: MEDICARE

## 2018-05-15 VITALS — SYSTOLIC BLOOD PRESSURE: 168 MMHG | DIASTOLIC BLOOD PRESSURE: 92 MMHG | HEART RATE: 82 BPM

## 2018-05-15 VITALS
DIASTOLIC BLOOD PRESSURE: 101 MMHG | HEIGHT: 65 IN | HEART RATE: 97 BPM | RESPIRATION RATE: 16 BRPM | TEMPERATURE: 98 F | SYSTOLIC BLOOD PRESSURE: 190 MMHG | OXYGEN SATURATION: 95 % | WEIGHT: 110.01 LBS

## 2018-05-15 PROCEDURE — 99284 EMERGENCY DEPT VISIT MOD MDM: CPT

## 2018-05-15 RX ORDER — CARVEDILOL PHOSPHATE 80 MG/1
25 CAPSULE, EXTENDED RELEASE ORAL EVERY 12 HOURS
Qty: 0 | Refills: 0 | Status: DISCONTINUED | OUTPATIENT
Start: 2018-05-15 | End: 2018-05-15

## 2018-05-15 RX ORDER — AMLODIPINE BESYLATE 2.5 MG/1
5 TABLET ORAL ONCE
Qty: 0 | Refills: 0 | Status: COMPLETED | OUTPATIENT
Start: 2018-05-15 | End: 2018-05-15

## 2018-05-15 RX ADMIN — AMLODIPINE BESYLATE 5 MILLIGRAM(S): 2.5 TABLET ORAL at 20:38

## 2018-05-15 RX ADMIN — CARVEDILOL PHOSPHATE 25 MILLIGRAM(S): 80 CAPSULE, EXTENDED RELEASE ORAL at 19:42

## 2018-05-15 NOTE — ED PROVIDER NOTE - MEDICAL DECISION MAKING DETAILS
89 y/o female presents with laceration from butter knife. Plan to clean wound, apply quick clot, wrap and discharge.

## 2018-05-15 NOTE — ED PROVIDER NOTE - NS_ ATTENDINGSCRIBEDETAILS _ED_A_ED_FT
I, Zak King MD,  performed the initial face to face bedside interview with this patient regarding history of present illness, review of symptoms and relevant past medical, social and family history.  I completed an independent physical examination.    The history, relevant review of systems, past medical and surgical history, medical decision making, and physical examination was documented by the scribe in my presence and I attest to the accuracy of the documentation.

## 2018-05-15 NOTE — ED ADULT TRIAGE NOTE - CHIEF COMPLAINT QUOTE
Patient dropped a butter knife and it caused laceration to calf. Patient on coumadin. Wrapped by [employer] prior to EMS arrival. Patient also states she did not take her blood pressure medication

## 2018-05-15 NOTE — ED PROVIDER NOTE - OBJECTIVE STATEMENT
89 y/o female with a PMHx of CABG, valve replacement, HTN on Plavix and baby ASA presents to the ED s/p dropping a butter on her right lower leg. +laceration. PCP Evelio.

## 2018-05-15 NOTE — ED PROVIDER NOTE - SKIN, MLM
Skin normal color for race, warm, dry. No evidence of rash. 1 mm punctate lac to the right anterior tibia, no active bleeding.
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
## 2018-05-17 DIAGNOSIS — S81.811A LACERATION WITHOUT FOREIGN BODY, RIGHT LOWER LEG, INITIAL ENCOUNTER: ICD-10-CM

## 2018-05-17 DIAGNOSIS — Z79.02 LONG TERM (CURRENT) USE OF ANTITHROMBOTICS/ANTIPLATELETS: ICD-10-CM

## 2018-05-17 DIAGNOSIS — I10 ESSENTIAL (PRIMARY) HYPERTENSION: ICD-10-CM

## 2018-05-17 DIAGNOSIS — Z88.5 ALLERGY STATUS TO NARCOTIC AGENT: ICD-10-CM

## 2018-05-17 DIAGNOSIS — Z79.82 LONG TERM (CURRENT) USE OF ASPIRIN: ICD-10-CM

## 2018-05-17 DIAGNOSIS — Z95.2 PRESENCE OF PROSTHETIC HEART VALVE: ICD-10-CM

## 2018-05-17 DIAGNOSIS — Z95.1 PRESENCE OF AORTOCORONARY BYPASS GRAFT: ICD-10-CM

## 2018-05-29 ENCOUNTER — MEDICATION RENEWAL (OUTPATIENT)
Age: 83
End: 2018-05-29

## 2018-05-29 ENCOUNTER — RX RENEWAL (OUTPATIENT)
Age: 83
End: 2018-05-29

## 2018-06-08 ENCOUNTER — MEDICATION RENEWAL (OUTPATIENT)
Age: 83
End: 2018-06-08

## 2018-06-08 ENCOUNTER — RX RENEWAL (OUTPATIENT)
Age: 83
End: 2018-06-08

## 2018-07-13 ENCOUNTER — INPATIENT (INPATIENT)
Facility: HOSPITAL | Age: 83
LOS: 3 days | Discharge: HOSPICE | End: 2018-07-17
Attending: FAMILY MEDICINE | Admitting: FAMILY MEDICINE
Payer: MEDICARE

## 2018-07-13 VITALS
WEIGHT: 100.09 LBS | RESPIRATION RATE: 18 BRPM | OXYGEN SATURATION: 100 % | SYSTOLIC BLOOD PRESSURE: 190 MMHG | DIASTOLIC BLOOD PRESSURE: 72 MMHG | HEART RATE: 72 BPM | HEIGHT: 60 IN

## 2018-07-13 LAB
ADD ON TEST-SPECIMEN IN LAB: SIGNIFICANT CHANGE UP
ALBUMIN SERPL ELPH-MCNC: 4 G/DL — SIGNIFICANT CHANGE UP (ref 3.3–5)
ALP SERPL-CCNC: 76 U/L — SIGNIFICANT CHANGE UP (ref 40–120)
ALT FLD-CCNC: 25 U/L — SIGNIFICANT CHANGE UP (ref 12–78)
ANION GAP SERPL CALC-SCNC: 9 MMOL/L — SIGNIFICANT CHANGE UP (ref 5–17)
APTT BLD: 39.7 SEC — HIGH (ref 27.5–37.4)
AST SERPL-CCNC: 35 U/L — SIGNIFICANT CHANGE UP (ref 15–37)
BASOPHILS # BLD AUTO: 0.02 K/UL — SIGNIFICANT CHANGE UP (ref 0–0.2)
BASOPHILS NFR BLD AUTO: 0.1 % — SIGNIFICANT CHANGE UP (ref 0–2)
BILIRUB SERPL-MCNC: 1.5 MG/DL — HIGH (ref 0.2–1.2)
BUN SERPL-MCNC: 32 MG/DL — HIGH (ref 7–23)
CALCIUM SERPL-MCNC: 8.9 MG/DL — SIGNIFICANT CHANGE UP (ref 8.5–10.1)
CHLORIDE SERPL-SCNC: 100 MMOL/L — SIGNIFICANT CHANGE UP (ref 96–108)
CK SERPL-CCNC: 184 U/L — SIGNIFICANT CHANGE UP (ref 26–192)
CO2 SERPL-SCNC: 28 MMOL/L — SIGNIFICANT CHANGE UP (ref 22–31)
CREAT SERPL-MCNC: 0.81 MG/DL — SIGNIFICANT CHANGE UP (ref 0.5–1.3)
EOSINOPHIL # BLD AUTO: 0.01 K/UL — SIGNIFICANT CHANGE UP (ref 0–0.5)
EOSINOPHIL NFR BLD AUTO: 0.1 % — SIGNIFICANT CHANGE UP (ref 0–6)
GLUCOSE SERPL-MCNC: 108 MG/DL — HIGH (ref 70–99)
HCT VFR BLD CALC: 42.7 % — SIGNIFICANT CHANGE UP (ref 34.5–45)
HGB BLD-MCNC: 14.8 G/DL — SIGNIFICANT CHANGE UP (ref 11.5–15.5)
IMM GRANULOCYTES NFR BLD AUTO: 0.8 % — SIGNIFICANT CHANGE UP (ref 0–1.5)
INR BLD: 1.36 RATIO — HIGH (ref 0.88–1.16)
LACTATE SERPL-SCNC: 2.3 MMOL/L — HIGH (ref 0.7–2)
LYMPHOCYTES # BLD AUTO: 1.57 K/UL — SIGNIFICANT CHANGE UP (ref 1–3.3)
LYMPHOCYTES # BLD AUTO: 9.5 % — LOW (ref 13–44)
MAGNESIUM SERPL-MCNC: 2.5 MG/DL — SIGNIFICANT CHANGE UP (ref 1.6–2.6)
MCHC RBC-ENTMCNC: 29.8 PG — SIGNIFICANT CHANGE UP (ref 27–34)
MCHC RBC-ENTMCNC: 34.7 GM/DL — SIGNIFICANT CHANGE UP (ref 32–36)
MCV RBC AUTO: 85.9 FL — SIGNIFICANT CHANGE UP (ref 80–100)
MONOCYTES # BLD AUTO: 1.08 K/UL — HIGH (ref 0–0.9)
MONOCYTES NFR BLD AUTO: 6.5 % — SIGNIFICANT CHANGE UP (ref 2–14)
NEUTROPHILS # BLD AUTO: 13.7 K/UL — HIGH (ref 1.8–7.4)
NEUTROPHILS NFR BLD AUTO: 83 % — HIGH (ref 43–77)
NRBC # BLD: 0 /100 WBCS — SIGNIFICANT CHANGE UP (ref 0–0)
PHOSPHATE SERPL-MCNC: 3.5 MG/DL — SIGNIFICANT CHANGE UP (ref 2.5–4.5)
PLATELET # BLD AUTO: 686 K/UL — HIGH (ref 150–400)
POTASSIUM SERPL-MCNC: 3.5 MMOL/L — SIGNIFICANT CHANGE UP (ref 3.5–5.3)
POTASSIUM SERPL-SCNC: 3.5 MMOL/L — SIGNIFICANT CHANGE UP (ref 3.5–5.3)
PROT SERPL-MCNC: 7.9 GM/DL — SIGNIFICANT CHANGE UP (ref 6–8.3)
PROTHROM AB SERPL-ACNC: 14.8 SEC — HIGH (ref 9.8–12.7)
RBC # BLD: 4.97 M/UL — SIGNIFICANT CHANGE UP (ref 3.8–5.2)
RBC # FLD: 14.5 % — SIGNIFICANT CHANGE UP (ref 10.3–14.5)
SODIUM SERPL-SCNC: 137 MMOL/L — SIGNIFICANT CHANGE UP (ref 135–145)
TROPONIN I SERPL-MCNC: 0.04 NG/ML — SIGNIFICANT CHANGE UP (ref 0.01–0.04)
WBC # BLD: 16.51 K/UL — HIGH (ref 3.8–10.5)
WBC # FLD AUTO: 16.51 K/UL — HIGH (ref 3.8–10.5)

## 2018-07-13 PROCEDURE — 99285 EMERGENCY DEPT VISIT HI MDM: CPT

## 2018-07-13 PROCEDURE — 93010 ELECTROCARDIOGRAM REPORT: CPT

## 2018-07-13 PROCEDURE — 70450 CT HEAD/BRAIN W/O DYE: CPT | Mod: 26

## 2018-07-13 PROCEDURE — 71045 X-RAY EXAM CHEST 1 VIEW: CPT | Mod: 26

## 2018-07-13 RX ORDER — MANNITOL
10 POWDER (GRAM) MISCELLANEOUS ONCE
Qty: 0 | Refills: 0 | Status: COMPLETED | OUTPATIENT
Start: 2018-07-13 | End: 2018-07-13

## 2018-07-13 RX ORDER — SODIUM CHLORIDE 9 MG/ML
1000 INJECTION INTRAMUSCULAR; INTRAVENOUS; SUBCUTANEOUS ONCE
Qty: 0 | Refills: 0 | Status: COMPLETED | OUTPATIENT
Start: 2018-07-13 | End: 2018-07-13

## 2018-07-13 RX ORDER — CEFTRIAXONE 500 MG/1
1000 INJECTION, POWDER, FOR SOLUTION INTRAMUSCULAR; INTRAVENOUS ONCE
Qty: 0 | Refills: 0 | Status: COMPLETED | OUTPATIENT
Start: 2018-07-13 | End: 2018-07-13

## 2018-07-13 RX ORDER — LABETALOL HCL 100 MG
10 TABLET ORAL ONCE
Qty: 0 | Refills: 0 | Status: COMPLETED | OUTPATIENT
Start: 2018-07-13 | End: 2018-07-13

## 2018-07-13 RX ORDER — AMLODIPINE BESYLATE 2.5 MG/1
1 TABLET ORAL
Qty: 0 | Refills: 0 | COMMUNITY

## 2018-07-13 RX ORDER — DEXAMETHASONE 0.5 MG/5ML
6 ELIXIR ORAL EVERY 8 HOURS
Qty: 0 | Refills: 0 | Status: DISCONTINUED | OUTPATIENT
Start: 2018-07-13 | End: 2018-07-15

## 2018-07-13 RX ORDER — HYDRALAZINE HCL 50 MG
5 TABLET ORAL EVERY 6 HOURS
Qty: 0 | Refills: 0 | Status: DISCONTINUED | OUTPATIENT
Start: 2018-07-13 | End: 2018-07-17

## 2018-07-13 RX ORDER — SODIUM CHLORIDE 9 MG/ML
1000 INJECTION INTRAMUSCULAR; INTRAVENOUS; SUBCUTANEOUS
Qty: 0 | Refills: 0 | Status: DISCONTINUED | OUTPATIENT
Start: 2018-07-13 | End: 2018-07-14

## 2018-07-13 RX ORDER — CEFTRIAXONE 500 MG/1
1 INJECTION, POWDER, FOR SOLUTION INTRAMUSCULAR; INTRAVENOUS ONCE
Qty: 0 | Refills: 0 | Status: DISCONTINUED | OUTPATIENT
Start: 2018-07-13 | End: 2018-07-13

## 2018-07-13 RX ORDER — SODIUM CHLORIDE 9 MG/ML
3 INJECTION INTRAMUSCULAR; INTRAVENOUS; SUBCUTANEOUS ONCE
Qty: 0 | Refills: 0 | Status: COMPLETED | OUTPATIENT
Start: 2018-07-13 | End: 2018-07-13

## 2018-07-13 RX ORDER — LEVETIRACETAM 250 MG/1
1000 TABLET, FILM COATED ORAL ONCE
Qty: 0 | Refills: 0 | Status: COMPLETED | OUTPATIENT
Start: 2018-07-13 | End: 2018-07-13

## 2018-07-13 RX ORDER — DEXAMETHASONE 0.5 MG/5ML
6 ELIXIR ORAL ONCE
Qty: 0 | Refills: 0 | Status: COMPLETED | OUTPATIENT
Start: 2018-07-13 | End: 2018-07-13

## 2018-07-13 RX ORDER — CEFTRIAXONE 500 MG/1
1000 INJECTION, POWDER, FOR SOLUTION INTRAMUSCULAR; INTRAVENOUS EVERY 24 HOURS
Qty: 0 | Refills: 0 | Status: DISCONTINUED | OUTPATIENT
Start: 2018-07-13 | End: 2018-07-14

## 2018-07-13 RX ADMIN — Medication 10 MILLIGRAM(S): at 15:52

## 2018-07-13 RX ADMIN — Medication 6 MILLIGRAM(S): at 19:00

## 2018-07-13 RX ADMIN — SODIUM CHLORIDE 1000 MILLILITER(S): 9 INJECTION INTRAMUSCULAR; INTRAVENOUS; SUBCUTANEOUS at 13:44

## 2018-07-13 RX ADMIN — LEVETIRACETAM 400 MILLIGRAM(S): 250 TABLET, FILM COATED ORAL at 19:30

## 2018-07-13 RX ADMIN — Medication 10 MILLIGRAM(S): at 19:30

## 2018-07-13 RX ADMIN — Medication 50 GRAM(S): at 19:15

## 2018-07-13 RX ADMIN — SODIUM CHLORIDE 3 MILLILITER(S): 9 INJECTION INTRAMUSCULAR; INTRAVENOUS; SUBCUTANEOUS at 12:30

## 2018-07-13 RX ADMIN — SODIUM CHLORIDE 1000 MILLILITER(S): 9 INJECTION INTRAMUSCULAR; INTRAVENOUS; SUBCUTANEOUS at 12:10

## 2018-07-13 RX ADMIN — CEFTRIAXONE 1000 MILLIGRAM(S): 500 INJECTION, POWDER, FOR SOLUTION INTRAMUSCULAR; INTRAVENOUS at 13:30

## 2018-07-13 NOTE — CHART NOTE - NSCHARTNOTEFT_GEN_A_CORE
Neurosurgery opinion.     I have reviewed images and case with ER attending. This is a devastating left frontal lobe intracerebral hemorrhage. with some intraventricular extension. There is no appropriate operative option. This will cause extensive ongoing disability for the patient with respect to independence- ability to interact, abililty to communicate.     I have reviewed the images and situation with the family. They have indicated that a medical proxy is in place and that in this circumstance the patient would not want any heroic measures or artificial resucitation or mechanical ventilation.

## 2018-07-13 NOTE — ED PROVIDER NOTE - CONSTITUTIONAL, MLM
normal... Well appearing, well nourished, awake, alert, oriented to person, place, time/situation and in no apparent distress. Well appearing, well nourished, awake, alert. Nonverbal, thin. - - -

## 2018-07-13 NOTE — ED PROVIDER NOTE - PROGRESS NOTE DETAILS
oncal neurosurgery called Dr. Siu, who is in the or and will call back. DARIUS Salgado DO JERROD Siu at bedside.  PT not a surgical candidate.  DNR DNI completed with Daughter Pt's surrogate.  Pt admitted to internal medicine.  Discussed with Dr. Navarro.  PT admitted in guarded condition.

## 2018-07-13 NOTE — ED PROVIDER NOTE - CRITICAL CARE PROVIDED
interpretation of diagnostic studies/consultation with other physicians/documentation/conducted a detailed discussion of DNR status/additional history taking/direct patient care (not related to procedure)/consult w/ pt's family directly relating to pts condition

## 2018-07-13 NOTE — ED PROVIDER NOTE - OBJECTIVE STATEMENT
89 y/o female with PMHx of HTN, frequent UTI presents to the ED BIBA for evaluation of AMS this morning. Pt was found this morning by home health aide, only responsive to painful stimuli.  Pt with Fever in the ED. As per home aide pt is normally responsive and verbal. Full HPI unobtainable due to AMS.  PCP-Francisca.

## 2018-07-13 NOTE — H&P ADULT - ASSESSMENT
89 yo female presented with altered mental status.    A/P:    1.  Altered mental status  Subdural hematoma  -prognosis poor  -will follow in telemetry   -do neuro check for the next 24 hours  -as per the ED provider, neurosurgery had seen the patient in ED and decided not to do any procedure at this point  -family members and HCP, as per the ED provider made the patient DNR and did not want any surgery either   -no documentation was found in the EMR by the Neurosurgery team at this moment   -will keep on Decadron  -avoid ASA, Plavix, heparin   -hold all the PO medications for now  -patient's prognosis is very poor and condition is guarded   -keep NPO and give mild hydration   -fall precaution and seizure precaution     2.  HTN  -will follow BP and give IV hydralazine if the Systolic BP>160 mm Hg     3.   SCD for DVT ppx     4.  Leucocytosis  ?h/o recent UTI  -keep on ceftriaxone for now

## 2018-07-13 NOTE — H&P ADULT - NSHPPHYSICALEXAM_GEN_ALL_CORE
Vital Signs Last 24 Hrs  T(C): 37.1 (13 Jul 2018 23:55), Max: 37.2 (13 Jul 2018 12:00)  T(F): 98.7 (13 Jul 2018 23:55), Max: 98.9 (13 Jul 2018 12:00)  HR: 62 (13 Jul 2018 23:55) (58 - 72)  BP: 174/59 (13 Jul 2018 23:55) (146/72 - 196/68)  RR: 18 (13 Jul 2018 23:55) (17 - 21)  SpO2: 98% (13 Jul 2018 23:55) (66% - 100%)

## 2018-07-13 NOTE — ED ADULT TRIAGE NOTE - CHIEF COMPLAINT QUOTE
pt found by home health aide, responsive to painful stimuli, holding eyes closed, vss at triage hot to touch

## 2018-07-13 NOTE — H&P ADULT - HISTORY OF PRESENT ILLNESS
89 y/o female with PMHx of HTN, frequent UTI presents to the ED BIBA for evaluation of AMS this morning. Pt was found this morning by home health aide, only responsive to painful stimuli.  Pt with Fever in the ED. As per home aide pt is normally responsive and verbal. Full HPI unobtainable due to AMS.

## 2018-07-13 NOTE — ED ADULT NURSE NOTE - OBJECTIVE STATEMENT
Pt biba; per ems pt was not answering door for home health aide this morning.  [employer] called, broke in house and found pt lying on couch unresponsive.  Pt presents to HH unresponsive covered in urine, temp 100.4R.  Per family no one has seen pt x 2 days as they were preoccupied with another family member in hospital.  According to aide pt is normally a/ox3.   Pt family and aide left prior to complete assessment.

## 2018-07-13 NOTE — ED PROVIDER NOTE - CARDIAC, MLM
Normal rate, regular rhythm.  Heart sounds S1, S2.  No murmurs, rubs or gallops. Normal rate, regular rhythm.  Heart sounds S1, S2.  rubs or gallops. +Cardiac murmur.

## 2018-07-13 NOTE — ED ADULT NURSE REASSESSMENT NOTE - NS ED NURSE REASSESS COMMENT FT1
Received report from Rosalind Jacobs RN. Pt sleeping in bed. Pt with AMS and +bleed. Pts family previously at bedside, left to bring daughter to [place]. Pts daughter admitted to hospital s/p unrelated car accident and is in SICU. Pt to be admitted to hospital, awaiting admission orders. DNR/DNI signed and on chart. Comfort and safety maintained. Cardiac monitoring maintained. Will continue to monitor and await admission orders.

## 2018-07-14 LAB
ANION GAP SERPL CALC-SCNC: 12 MMOL/L — SIGNIFICANT CHANGE UP (ref 5–17)
BASOPHILS # BLD AUTO: 0.01 K/UL — SIGNIFICANT CHANGE UP (ref 0–0.2)
BASOPHILS NFR BLD AUTO: 0.1 % — SIGNIFICANT CHANGE UP (ref 0–2)
BUN SERPL-MCNC: 31 MG/DL — HIGH (ref 7–23)
CALCIUM SERPL-MCNC: 8.4 MG/DL — LOW (ref 8.5–10.1)
CHLORIDE SERPL-SCNC: 109 MMOL/L — HIGH (ref 96–108)
CO2 SERPL-SCNC: 23 MMOL/L — SIGNIFICANT CHANGE UP (ref 22–31)
CREAT SERPL-MCNC: 0.62 MG/DL — SIGNIFICANT CHANGE UP (ref 0.5–1.3)
EOSINOPHIL # BLD AUTO: 0 K/UL — SIGNIFICANT CHANGE UP (ref 0–0.5)
EOSINOPHIL NFR BLD AUTO: 0 % — SIGNIFICANT CHANGE UP (ref 0–6)
GLUCOSE SERPL-MCNC: 121 MG/DL — HIGH (ref 70–99)
HCT VFR BLD CALC: 37.7 % — SIGNIFICANT CHANGE UP (ref 34.5–45)
HGB BLD-MCNC: 12.9 G/DL — SIGNIFICANT CHANGE UP (ref 11.5–15.5)
IMM GRANULOCYTES NFR BLD AUTO: 0.9 % — SIGNIFICANT CHANGE UP (ref 0–1.5)
LYMPHOCYTES # BLD AUTO: 0.69 K/UL — LOW (ref 1–3.3)
LYMPHOCYTES # BLD AUTO: 6.1 % — LOW (ref 13–44)
MCHC RBC-ENTMCNC: 29.9 PG — SIGNIFICANT CHANGE UP (ref 27–34)
MCHC RBC-ENTMCNC: 34.2 GM/DL — SIGNIFICANT CHANGE UP (ref 32–36)
MCV RBC AUTO: 87.3 FL — SIGNIFICANT CHANGE UP (ref 80–100)
MONOCYTES # BLD AUTO: 0.26 K/UL — SIGNIFICANT CHANGE UP (ref 0–0.9)
MONOCYTES NFR BLD AUTO: 2.3 % — SIGNIFICANT CHANGE UP (ref 2–14)
NEUTROPHILS # BLD AUTO: 10.33 K/UL — HIGH (ref 1.8–7.4)
NEUTROPHILS NFR BLD AUTO: 90.6 % — HIGH (ref 43–77)
NRBC # BLD: 0 /100 WBCS — SIGNIFICANT CHANGE UP (ref 0–0)
PLATELET # BLD AUTO: 539 K/UL — HIGH (ref 150–400)
POTASSIUM SERPL-MCNC: 2.9 MMOL/L — CRITICAL LOW (ref 3.5–5.3)
POTASSIUM SERPL-MCNC: 3.4 MMOL/L — LOW (ref 3.5–5.3)
POTASSIUM SERPL-SCNC: 2.9 MMOL/L — CRITICAL LOW (ref 3.5–5.3)
POTASSIUM SERPL-SCNC: 3.4 MMOL/L — LOW (ref 3.5–5.3)
RAPID RVP RESULT: SIGNIFICANT CHANGE UP
RBC # BLD: 4.32 M/UL — SIGNIFICANT CHANGE UP (ref 3.8–5.2)
RBC # FLD: 14.4 % — SIGNIFICANT CHANGE UP (ref 10.3–14.5)
SODIUM SERPL-SCNC: 144 MMOL/L — SIGNIFICANT CHANGE UP (ref 135–145)
WBC # BLD: 11.39 K/UL — HIGH (ref 3.8–10.5)
WBC # FLD AUTO: 11.39 K/UL — HIGH (ref 3.8–10.5)

## 2018-07-14 RX ORDER — POTASSIUM CHLORIDE 20 MEQ
10 PACKET (EA) ORAL
Qty: 0 | Refills: 0 | Status: COMPLETED | OUTPATIENT
Start: 2018-07-14 | End: 2018-07-14

## 2018-07-14 RX ORDER — DEXTROSE MONOHYDRATE, SODIUM CHLORIDE, AND POTASSIUM CHLORIDE 50; .745; 4.5 G/1000ML; G/1000ML; G/1000ML
1000 INJECTION, SOLUTION INTRAVENOUS
Qty: 0 | Refills: 0 | Status: DISCONTINUED | OUTPATIENT
Start: 2018-07-14 | End: 2018-07-16

## 2018-07-14 RX ADMIN — Medication 100 MILLIEQUIVALENT(S): at 11:44

## 2018-07-14 RX ADMIN — Medication 6 MILLIGRAM(S): at 21:01

## 2018-07-14 RX ADMIN — Medication 100 MILLIEQUIVALENT(S): at 13:05

## 2018-07-14 RX ADMIN — DEXTROSE MONOHYDRATE, SODIUM CHLORIDE, AND POTASSIUM CHLORIDE 60 MILLILITER(S): 50; .745; 4.5 INJECTION, SOLUTION INTRAVENOUS at 17:47

## 2018-07-14 RX ADMIN — SODIUM CHLORIDE 60 MILLILITER(S): 9 INJECTION INTRAMUSCULAR; INTRAVENOUS; SUBCUTANEOUS at 01:36

## 2018-07-14 RX ADMIN — Medication 6 MILLIGRAM(S): at 05:48

## 2018-07-14 RX ADMIN — Medication 100 MILLIEQUIVALENT(S): at 15:04

## 2018-07-14 RX ADMIN — Medication 100 MILLIEQUIVALENT(S): at 22:01

## 2018-07-14 RX ADMIN — Medication 100 MILLIEQUIVALENT(S): at 22:55

## 2018-07-14 RX ADMIN — Medication 6 MILLIGRAM(S): at 13:05

## 2018-07-14 RX ADMIN — Medication 100 MILLIEQUIVALENT(S): at 21:01

## 2018-07-14 RX ADMIN — Medication 6 MILLIGRAM(S): at 01:36

## 2018-07-14 RX ADMIN — Medication 5 MILLIGRAM(S): at 07:54

## 2018-07-14 RX ADMIN — Medication 5 MILLIGRAM(S): at 17:47

## 2018-07-14 RX ADMIN — CEFTRIAXONE 1000 MILLIGRAM(S): 500 INJECTION, POWDER, FOR SOLUTION INTRAMUSCULAR; INTRAVENOUS at 05:49

## 2018-07-14 NOTE — PROGRESS NOTE ADULT - SUBJECTIVE AND OBJECTIVE BOX
Reason for Admission: Altered mental status	  History of Present Illness: 	  89 y/o female with PMHx of HTN, frequent UTI presents to the ED BIBA for evaluation of AMS this morning. Pt was found this morning by home health aide, only responsive to painful stimuli in ED as per admitting attending.  Pt with Fever in the ED. As per home aide pt is normally responsive and verbal.     -patient opnes eyes  to verbal stimulus and speech which is difficult to understand ,but knows her name          PHYSICAL EXAM:    Daily     Daily Weight in k.7 (2018 10:00)    ICU Vital Signs Last 24 Hrs  T(C): 36.3 (2018 10:37), Max: 37.1 (2018 20:10)  T(F): 97.3 (2018 10:37), Max: 98.8 (2018 20:10)  HR: 61 (2018 10:37) (56 - 65)  BP: 164/63 (2018 10:37) (146/72 - 175/58)  BP(mean): --  ABP: --  ABP(mean): --  RR: 17 (2018 10:37) (17 - 20)  SpO2: 99% (2018 10:37) (96% - 99%)      Constitutional: appears ill and frail  HEENT: Atraumatic, HANH, Normal, No congestion  Respiratory: Breath Sounds normal, no rhonchi/wheeze  Cardiovascular: N S1S2;  Gastrointestinal: Abdomen soft, non tender, Bowel Ssounds present  Extremities: No edema, peripheral pulses present  Neurological: awake , orientedx1, contracted upper extremities, her speech is difficult to understand, but tries to follow one step command      Breasts: Deferred  Genitourinary: deferred  Rectal: Deferred                          12.9   11.39 )-----------( 539      ( 2018 07:13 )             37.7       CBC Full  -  ( 2018 07:13 )  WBC Count : 11.39 K/uL  Hemoglobin : 12.9 g/dL  Hematocrit : 37.7 %  Platelet Count - Automated : 539 K/uL  Mean Cell Volume : 87.3 fl  Mean Cell Hemoglobin : 29.9 pg  Mean Cell Hemoglobin Concentration : 34.2 gm/dL  Auto Neutrophil # : 10.33 K/uL  Auto Lymphocyte # : 0.69 K/uL  Auto Monocyte # : 0.26 K/uL  Auto Eosinophil # : 0.00 K/uL  Auto Basophil # : 0.01 K/uL  Auto Neutrophil % : 90.6 %  Auto Lymphocyte % : 6.1 %  Auto Monocyte % : 2.3 %  Auto Eosinophil % : 0.0 %  Auto Basophil % : 0.1 %          144  |  109<H>  |  31<H>  ----------------------------<  121<H>  2.9<LL>   |  23  |  0.62    Ca    8.4<L>      2018 07:13  Phos  3.5       Mg     2.5         TPro  7.9  /  Alb  4.0  /  TBili  1.5<H>  /  DBili  x   /  AST  35  /  ALT  25  /  AlkPhos  76        LIVER FUNCTIONS - ( 2018 12:10 )  Alb: 4.0 g/dL / Pro: 7.9 gm/dL / ALK PHOS: 76 U/L / ALT: 25 U/L / AST: 35 U/L / GGT: x             PT/INR - ( 2018 12:32 )   PT: 14.8 sec;   INR: 1.36 ratio         PTT - ( 2018 12:32 )  PTT:39.7 sec    CARDIAC MARKERS ( 2018 12:10 )  0.037 ng/mL / x     / 184 U/L / x     / x                    MEDICATIONS  (STANDING):  dexamethasone  Injectable 6 milliGRAM(s) IV Push every 8 hours  sodium chloride 0.9%. 1000 milliLiter(s) (60 mL/Hr) IV Continuous <Continuous>

## 2018-07-14 NOTE — PROGRESS NOTE ADULT - ASSESSMENT
· Assessment		  89 yo female presented with altered mental status.    A/P:    1.  Altered mental status  Subdural hematoma  -prognosis poor  -not a neuro surgical candidate per neurosurgery  -will follow in telemetry   -do neuro check for the next 24 hours  -as per the ED provider, neurosurgery had seen the patient in ED and decided not to do any procedure at this point  -family members and HCP, as per the ED provider made the patient DNR and did not want any surgery either   -will keep on Decadron  -avoid ASA, Plavix, heparin   -hold all the PO medications for now  -patient's prognosis is very poor and condition is guarded   -keep NPO and give mild hydration   -fall precaution and seizure precaution   speech and swallow  palliative consult    2.  HTN  -will follow BP and give IV hydralazine if the Systolic BP>160 mm Hg     3.   SCD for DVT ppx     4.  Leucocytosis possibly reactive /afebrile  ?h/o recent UTI  UA not done  will hold abx for now and watch for febver and monitor WBC · Assessment		  89 yo female presented with altered mental status.    A/P:    1.  Altered mental status  Subdural hematoma  large left frontal lobe intracerebral hemorrhage. with some intraventricular extension. There is no appropriate operative option per neuro surgery. This can  cause extensive ongoing disability for the patient with respect to independence- ability to interact, abililty to communicate. family would not like any heroic measures   -prognosis poor  -not a neuro surgical candidate per neurosurgery  -will follow in telemetry   -do neuro check for the next 24 hours  -as per the ED provider, neurosurgery had seen the patient in ED and decided not to do any procedure at this point  -family members and HCP, as per the ED provider made the patient DNR and did not want any surgery either   -will keep on Decadron  -avoid ASA, Plavix, heparin   -hold all the PO medications for now  -patient's prognosis is very poor and condition is guarded   -keep NPO and give mild hydration   -fall precaution and seizure precaution   speech and swallow  palliative consult    2.  HTN  -will follow BP and give IV hydralazine if the Systolic BP>160 mm Hg     3.   SCD for DVT ppx     4.  Leucocytosis possibly reactive /afebrile  ?h/o recent UTI  UA not done  will hold abx for now and watch for febver and monitor WBC · Assessment		  89 yo female presented with altered mental status.    A/P:    1.  Altered mental status  Subdural hematoma  large left frontal lobe intracerebral hemorrhage. with some intraventricular extension. There is no appropriate operative option per neuro surgery. This can  cause extensive ongoing disability for the patient with respect to independence- ability to interact, abililty to communicate. family would not like any heroic measures   -prognosis poor  -not a neuro surgical candidate per neurosurgery  -will follow in telemetry   -do neuro check for the next 24 hours  -as per the ED provider, neurosurgery had seen the patient in ED and decided not to do any procedure at this point  -family members and HCP, as per the ED provider made the patient DNR and did not want any surgery either   -will keep on Decadron  -avoid ASA, Plavix, heparin   -hold all the PO medications for now  -patient's prognosis is very poor and condition is guarded   -keep NPO and give mild hydration   -fall precaution and seizure precaution   speech and swallow  palliative consult    2.  HTN  -will follow BP and give IV hydralazine if the Systolic BP>160 mm Hg     3.   SCD for DVT ppx     4.  Leucocytosis possibly reactive /afebrile  ?h/o recent UTI  UA not done  will hold abx for now and watch for febver and monitor WBC   check blood cx, urine cx and UA

## 2018-07-15 LAB
ANION GAP SERPL CALC-SCNC: 8 MMOL/L — SIGNIFICANT CHANGE UP (ref 5–17)
BASOPHILS # BLD AUTO: 0.01 K/UL — SIGNIFICANT CHANGE UP (ref 0–0.2)
BASOPHILS NFR BLD AUTO: 0.1 % — SIGNIFICANT CHANGE UP (ref 0–2)
BUN SERPL-MCNC: 38 MG/DL — HIGH (ref 7–23)
CALCIUM SERPL-MCNC: 8.4 MG/DL — LOW (ref 8.5–10.1)
CHLORIDE SERPL-SCNC: 110 MMOL/L — HIGH (ref 96–108)
CO2 SERPL-SCNC: 22 MMOL/L — SIGNIFICANT CHANGE UP (ref 22–31)
CREAT SERPL-MCNC: 0.63 MG/DL — SIGNIFICANT CHANGE UP (ref 0.5–1.3)
EOSINOPHIL # BLD AUTO: 0 K/UL — SIGNIFICANT CHANGE UP (ref 0–0.5)
EOSINOPHIL NFR BLD AUTO: 0 % — SIGNIFICANT CHANGE UP (ref 0–6)
GLUCOSE SERPL-MCNC: 98 MG/DL — SIGNIFICANT CHANGE UP (ref 70–99)
HCT VFR BLD CALC: 38.5 % — SIGNIFICANT CHANGE UP (ref 34.5–45)
HGB BLD-MCNC: 13.1 G/DL — SIGNIFICANT CHANGE UP (ref 11.5–15.5)
IMM GRANULOCYTES NFR BLD AUTO: 0.7 % — SIGNIFICANT CHANGE UP (ref 0–1.5)
LYMPHOCYTES # BLD AUTO: 0.77 K/UL — LOW (ref 1–3.3)
LYMPHOCYTES # BLD AUTO: 4.8 % — LOW (ref 13–44)
MCHC RBC-ENTMCNC: 29.7 PG — SIGNIFICANT CHANGE UP (ref 27–34)
MCHC RBC-ENTMCNC: 34 GM/DL — SIGNIFICANT CHANGE UP (ref 32–36)
MCV RBC AUTO: 87.3 FL — SIGNIFICANT CHANGE UP (ref 80–100)
MONOCYTES # BLD AUTO: 0.66 K/UL — SIGNIFICANT CHANGE UP (ref 0–0.9)
MONOCYTES NFR BLD AUTO: 4.1 % — SIGNIFICANT CHANGE UP (ref 2–14)
NEUTROPHILS # BLD AUTO: 14.49 K/UL — HIGH (ref 1.8–7.4)
NEUTROPHILS NFR BLD AUTO: 90.3 % — HIGH (ref 43–77)
NRBC # BLD: 0 /100 WBCS — SIGNIFICANT CHANGE UP (ref 0–0)
PLATELET # BLD AUTO: 660 K/UL — HIGH (ref 150–400)
POTASSIUM SERPL-MCNC: 3.9 MMOL/L — SIGNIFICANT CHANGE UP (ref 3.5–5.3)
POTASSIUM SERPL-SCNC: 3.9 MMOL/L — SIGNIFICANT CHANGE UP (ref 3.5–5.3)
RBC # BLD: 4.41 M/UL — SIGNIFICANT CHANGE UP (ref 3.8–5.2)
RBC # FLD: 15 % — HIGH (ref 10.3–14.5)
SODIUM SERPL-SCNC: 140 MMOL/L — SIGNIFICANT CHANGE UP (ref 135–145)
WBC # BLD: 16.05 K/UL — HIGH (ref 3.8–10.5)
WBC # FLD AUTO: 16.05 K/UL — HIGH (ref 3.8–10.5)

## 2018-07-15 RX ORDER — AMLODIPINE BESYLATE 2.5 MG/1
5 TABLET ORAL DAILY
Qty: 0 | Refills: 0 | Status: DISCONTINUED | OUTPATIENT
Start: 2018-07-15 | End: 2018-07-17

## 2018-07-15 RX ORDER — ACETAMINOPHEN 500 MG
650 TABLET ORAL EVERY 6 HOURS
Qty: 0 | Refills: 0 | Status: DISCONTINUED | OUTPATIENT
Start: 2018-07-15 | End: 2018-07-16

## 2018-07-15 RX ADMIN — AMLODIPINE BESYLATE 5 MILLIGRAM(S): 2.5 TABLET ORAL at 18:50

## 2018-07-15 RX ADMIN — Medication 6 MILLIGRAM(S): at 05:06

## 2018-07-15 RX ADMIN — Medication 5 MILLIGRAM(S): at 00:15

## 2018-07-15 RX ADMIN — Medication 5 MILLIGRAM(S): at 16:27

## 2018-07-15 RX ADMIN — Medication 5 MILLIGRAM(S): at 06:30

## 2018-07-15 NOTE — PROGRESS NOTE ADULT - ASSESSMENT
· Assessment		  89 yo female presented with altered mental status.    A/P:    1.  Altered mental status-mentation slightly improved  since admission but still has a gaurded prognosis  intracranial hemorrhage  large left frontal lobe intracerebral hemorrhage. with some intraventricular extension. There is no appropriate operative option per neuro surgery. This can  cause extensive ongoing disability for the patient with respect to independence- ability to interact, abililty to communicate. family would not like any heroic measures   -prognosis poor  -not a neuro surgical candidate per neurosurgery  d/c tele  I discussed with neurosurgery KIMBER Ma -decadron not indicated in intracerebral bleed and would not improve prognosis, will d/c decadron    -avoid ASA, Plavix, heparin   will resume po meds and dysphagia diet- speech and swallow recommendations reviewed  -patient's prognosis is very poor and condition is guarded   -fall precaution and seizure precaution   palliative consult to have meeting with family 7/16    2.  HTN  -will follow BP and give IV hydralazine if the Systolic BP>160 mm Hg   resume po bp meds    3.   SCD for DVT ppx     4.  Leucocytosis possibly reactive /afebrile  ?h/o recent UTI    will hold abx for now and watch for febver and monitor WBC   check blood cx, urine cx and UA · Assessment		  89 yo female presented with altered mental status.    A/P:    1.  Altered mental status-mentation slightly improved  since admission but still has a gaurded prognosis  intracranial hemorrhage  large left frontal lobe intracerebral hemorrhage. with some intraventricular extension. There is no appropriate operative option per neuro surgery. This can  cause extensive ongoing disability for the patient with respect to independence- ability to interact, abililty to communicate. family would not like any heroic measures   -prognosis poor  -not a neuro surgical candidate per neurosurgery  d/c tele  I discussed with neurosurgery KIMBER Ma -decadron not indicated in intracerebral bleed and would not improve prognosis, will d/c decadron    -avoid ASA, Plavix, heparin   will resume po meds and dysphagia diet- speech and swallow recommendations reviewed  -patient's prognosis is very poor and condition is guarded   -fall precaution and seizure precaution   palliative consult to have meeting with family 7/16    2.  HTN  -will follow BP and give IV hydralazine if the Systolic BP>160 mm Hg   hold coreg due to sinus bradycardia  start norvasc 5mg , may increase to 10mg  would also start ACEI if needed    3.   SCD for DVT ppx     4.  Leucocytosis possibly reactive /afebrile  ?h/o recent UTI    will hold abx for now and watch for febver and monitor WBC   check blood cx, urine cx and UA · Assessment		  89 yo female presented with altered mental status.    A/P:    1.  Altered mental status-mentation slightly improved  since admission but still has a gaurded prognosis  intracranial hemorrhage  large left frontal lobe intracerebral hemorrhage. with some intraventricular extension. There is no appropriate operative option per neuro surgery. This can  cause extensive ongoing disability for the patient with respect to independence- ability to interact, abililty to communicate. family would not like any heroic measures   -prognosis poor  -not a neuro surgical candidate per neurosurgery  d/c tele  I discussed with neurosurgery KIMBER Ma -decadron not indicated in intracerebral bleed and would not improve prognosis, will d/c decadron    -avoid ASA, Plavix, heparin   will resume po meds and dysphagia diet- speech and swallow recommendations reviewed  -patient's prognosis is very poor and condition is guarded   -fall precaution and seizure precaution   palliative consult to have meeting with family 7/16    2.  HTN  -will follow BP and give IV hydralazine if the Systolic BP>160 mm Hg   hold coreg due to sinus bradycardia  start norvasc 5mg , may increase to 10mg  would also start ACEI if needed    3.   SCD for DVT ppx     4.  Leucocytosis possibly reactive  and from decadron/afebrile  ?h/o recent UTI    will hold abx for now and watch for febver and monitor WBC   check blood cx negative ,  UA ordered still pending  CXR no pneumonia

## 2018-07-15 NOTE — SWALLOW BEDSIDE ASSESSMENT ADULT - ORAL PHASE
Bolus formation/transfer with mechanical soft solids were marked by moderately to excessively prolonged discontinuous munch masticatory motions. Bolus formation/transfer with pureed foods/liquids were achieved via mildly to moderately prolonged but functional lingual pumping actions. Piecemeal deglutition was evident. Scattered tongue debris was noted with mechanical soft solids only.

## 2018-07-15 NOTE — PROGRESS NOTE ADULT - SUBJECTIVE AND OBJECTIVE BOX
Reason for Admission: Altered mental status	  History of Present Illness: 	  89 y/o female with PMHx of HTN, frequent UTI presents to the ED BIBA for evaluation of AMS this morning. Pt was found this morning by home health aide, only responsive to painful stimuli in ED as per admitting attending.  Pt with Fever in the ED. As per home aide pt is normally responsive and verbal.     7/15-patient opnes eyes  to verbal stimulus and speech which is difficult to understand ,but knows her name  Constitutional: appears ill and frail  HEENT: Atraumatic, HANH, Normal, No congestion  Respiratory: Breath Sounds normal, no rhonchi/wheeze  Cardiovascular: N S1S2;  Gastrointestinal: Abdomen soft, non tender, Bowel Ssounds present  Extremities: No edema, peripheral pulses present  Neurological: awake , orientedx1, contracted upper extremities, her speech is difficult to understand, but tries to follow one step command      Breasts: Deferred  Genitourinary: deferred  Rectal: Deferred      PHYSICAL EXAM:    Daily     Daily Weight in k.4 (15 Jul 2018 08:22)    ICU Vital Signs Last 24 Hrs  T(C): 36.4 (15 Jul 2018 16:05), Max: 37.3 (2018 21:14)  T(F): 97.5 (15 Jul 2018 16:05), Max: 99.2 (2018 21:14)  HR: 57 (15 Jul 2018 16:05) (57 - 69)  BP: 187/58 (15 Jul 2018 16:05) (146/45 - 187/58)  BP(mean): --  ABP: --  ABP(mean): --  RR: 18 (15 Jul 2018 16:05) (17 - 18)  SpO2: 100% (15 Jul 2018 16:05) (95% - 100%)                                13.1   16.05 )-----------( 660      ( 15 Jul 2018 06:19 )             38.5       CBC Full  -  ( 15 Jul 2018 06:19 )  WBC Count : 16.05 K/uL  Hemoglobin : 13.1 g/dL  Hematocrit : 38.5 %  Platelet Count - Automated : 660 K/uL  Mean Cell Volume : 87.3 fl  Mean Cell Hemoglobin : 29.7 pg  Mean Cell Hemoglobin Concentration : 34.0 gm/dL  Auto Neutrophil # : 14.49 K/uL  Auto Lymphocyte # : 0.77 K/uL  Auto Monocyte # : 0.66 K/uL  Auto Eosinophil # : 0.00 K/uL  Auto Basophil # : 0.01 K/uL  Auto Neutrophil % : 90.3 %  Auto Lymphocyte % : 4.8 %  Auto Monocyte % : 4.1 %  Auto Eosinophil % : 0.0 %  Auto Basophil % : 0.1 %      15    140  |  110<H>  |  38<H>  ----------------------------<  98  3.9   |  22  |  0.63    Ca    8.4<L>      15 Jul 2018 06:19                              MEDICATIONS  (STANDING):  sodium chloride 0.45% with potassium chloride 20 mEq/L 1000 milliLiter(s) (60 mL/Hr) IV Continuous <Continuous>

## 2018-07-15 NOTE — CONSULT NOTE ADULT - SUBJECTIVE AND OBJECTIVE BOX
HPI: Pt is a 89y old Female with hx of HTN, recurrent UTI admitted from home  after being found unresponsive by home health aide. Hosp course notable for Ct revealing devastating bleed in left frontal temporal lobe with midline shift and intraventricular extension- not a candidate for neurosx intervention.  Pt was started on IV steroids and Iv abx for ?fever but per chart family had requested comfort care.  Palliative medicine consulted for assistance with goals of care.    Pt seen and examined by me with no family present at bedside for evaluation of goals of care.  Pt is lying in bed awake and able to answer some questions.  She states she has some pain in her head behind her ears.  She is unable to describe further but denies any other pain.  She denies SOB.  She denies nausea.  she states she doesn't feel hungry but before when she did she ate a few bites and then was full. She is able to answer some questions but then with others she answers inappropriately      PAIN: (x )Yes   ( )No  Unable to obatin detailed hx due to pts mental status  Level:  Location: headache  Intensity:    /10  Quality:  Aggravating Factors:  Alleviating Factors:  Radiation:  Duration/Timing:  Impact on ADLs:    DYSPNEA: ( ) Yes  (x ) No      PAST MEDICAL & SURGICAL HISTORY:  HTN (hypertension)  Recurrent UTI      SOCIAL HX: Lived at home with assistance of home health aide    Hx opiate tolerance ( )YES  (x )NO  Per chart review as pt unable to provide due to mental status    Baseline ADLs  (Prior to Admission)  ( ) Independent   (x )Dependent  Some assistance Per chart review as pt unable to provide due to mental status    FAMILY HISTORY:  pt unable to provide due to mental status      Review of Systems:    Anxiety- denies  Depression-  Physical Discomfort- yes, headache  Dyspnea- denies  Constipation-  Diarrhea-  Nausea- denies  Vomiting-  Anorexia- yes  Weight Loss-   Cough-  Secretions-  Fatigue- mild- mod  Weakness- mod  Delirium-      Limited due to: brain bleed, mental status      PHYSICAL EXAM:    Vital Signs Last 24 Hrs  T(C): 36.6 (15 Jul 2018 10:33), Max: 37.3 (2018 21:14)  T(F): 97.8 (15 Jul 2018 10:33), Max: 99.2 (2018 21:14)  HR: 60 (15 Jul 2018 10:33) (60 - 69)  BP: 146/45 (15 Jul 2018 10:33) (146/45 - 174/51)  RR: 18 (15 Jul 2018 10:33) (17 - 18)  SpO2: 95% (15 Jul 2018 10:33) (95% - 99%)  Daily Weight in k.4 (15 Jul 2018 08:22)    PPSV2:  20 %    General: thin elderly female, comfortable  Mental Status: awake, oriented to self  HEENT: moist oral mucosa  Lungs: decreased bibasilar   Cardiac: S1S2+  GI: soft, NT, + bowel sounds  : voiding, incontinent  Ext: moves UE b/l  Neuro: answers questions- some appropriately, follows some commands      LABS:                        13.1   16.05 )-----------( 660      ( 15 Jul 2018 06:19 )             38.5     07-15    140  |  110<H>  |  38<H>  ----------------------------<  98  3.9   |  22  |  0.63    Ca    8.4<L>      15 Jul 2018 06:19        Albumin: Albumin, Serum: 4.0 g/dL ( @ 12:10)      Allergies  codeine (Unknown)  epinephrine (Unknown)    MEDICATIONS  (STANDING):  sodium chloride 0.45% with potassium chloride 20 mEq/L 1000 milliLiter(s) (60 mL/Hr) IV Continuous <Continuous>    MEDICATIONS  (PRN):  hydrALAZINE Injectable 5 milliGRAM(s) IV Push every 6 hours PRN Systolic BP>160 mm Hg      RADIOLOGY/ADDITIONAL STUDIES: HPI: Pt is a 89y old Female with hx of HTN, recurrent UTI admitted from home  after being found unresponsive by home health aide. Hosp course notable for Ct revealing devastating bleed in left frontal temporal lobe with midline shift and intraventricular extension- not a candidate for neurosx intervention.  Pt was started on IV steroids and Iv abx for ?fever but per chart family had requested comfort care.  Palliative medicine consulted for assistance with goals of care.    Pt seen and examined by me with no family present at bedside for evaluation of goals of care.  Pt is lying in bed awake and able to answer some questions.  She states she has some pain in her head behind her ears.  She is unable to describe further but denies any other pain.  She denies SOB.  She denies nausea.  she states she doesn't feel hungry but before when she did she ate a few bites and then was full. She is able to answer some questions but then with others she answers inappropriately      PAIN: (x )Yes   ( )No  Unable to obatin detailed hx due to pts mental status  Level:  Location: headache  Intensity:    /10  Quality:  Aggravating Factors:  Alleviating Factors:  Radiation:  Duration/Timing:  Impact on ADLs:    DYSPNEA: ( ) Yes  (x ) No      PAST MEDICAL & SURGICAL HISTORY:  HTN (hypertension)  Recurrent UTI      SOCIAL HX: Lived at home with assistance of home health aide    Hx opiate tolerance ( )YES  (x )NO  Per chart review as pt unable to provide due to mental status    Baseline ADLs  (Prior to Admission)  ( ) Independent   (x )Dependent  Some assistance Per chart review as pt unable to provide due to mental status    FAMILY HISTORY:  pt unable to provide due to mental status      Review of Systems:    Anxiety- denies  Depression-  Physical Discomfort- yes, headache  Dyspnea- denies  Constipation-  Diarrhea-  Nausea- denies  Vomiting-  Anorexia- yes  Weight Loss-   Cough-  Secretions-  Fatigue- mild- mod  Weakness- mod  Delirium-      Limited due to: brain bleed, mental status      PHYSICAL EXAM:    Vital Signs Last 24 Hrs  T(C): 36.6 (15 Jul 2018 10:33), Max: 37.3 (2018 21:14)  T(F): 97.8 (15 Jul 2018 10:33), Max: 99.2 (2018 21:14)  HR: 60 (15 Jul 2018 10:33) (60 - 69)  BP: 146/45 (15 Jul 2018 10:33) (146/45 - 174/51)  RR: 18 (15 Jul 2018 10:33) (17 - 18)  SpO2: 95% (15 Jul 2018 10:33) (95% - 99%)  Daily Weight in k.4 (15 Jul 2018 08:22)    PPSV2:  20 %    General: thin elderly female, comfortable  Mental Status: awake, oriented to self  HEENT: moist oral mucosa  Lungs: decreased bibasilar   Cardiac: S1S2+  GI: soft, NT, + bowel sounds  : voiding, incontinent  Ext: moves UE b/l  Neuro: answers questions- some appropriately, follows some commands      LABS:                        13.1   16.05 )-----------( 660      ( 15 Jul 2018 06:19 )             38.5     07-15    140  |  110<H>  |  38<H>  ----------------------------<  98  3.9   |  22  |  0.63    Ca    8.4<L>      15 Jul 2018 06:19        Albumin: Albumin, Serum: 4.0 g/dL ( @ 12:10)      Allergies  codeine (Unknown)  epinephrine (Unknown)    MEDICATIONS  (STANDING):  sodium chloride 0.45% with potassium chloride 20 mEq/L 1000 milliLiter(s) (60 mL/Hr) IV Continuous <Continuous>    MEDICATIONS  (PRN):  hydrALAZINE Injectable 5 milliGRAM(s) IV Push every 6 hours PRN Systolic BP>160 mm Hg      RADIOLOGY/ADDITIONAL STUDIES:    EXAM:  CT BRAIN                        PROCEDURE DATE:  2018    COMPARISON: Head CTs dated 2018 and 2017.    FINDINGS:      There is a new acute parenchymal inferior left frontal lobe hematoma   measuring 4.4 x 6.2 x 4.3 cm (TR by AP by CC), involving the left frontal   rectus/orbital gyri. There is small amount of surrounding associated   vision and edema. There is associated mass effect with effacement of the   involved sulci and superior displacement of the frontal horn of the left   lateral ventricle which is slitlike. There is an associated 0.6 cm   rightward midline shift at the level of the foramen of Syeda/third   ventricle. The basilar cisterns are preserved. No uncal/transtentorial or   tonsillar herniation.  Is an additional, there is intraventricular extension of hemorrhage with   blood layering within the bilateral occipital horns of the lateral   ventricles, greater on left. There is currently no evidence for   hydrocephalus, with ventricular volumes being stable since head CT of   2018.  There is unchanged patchy nonspecific bilateral cerebral white matter   hypodensities, likely reflective of mild chronic microvascular changes.  No extra-axial collection.     The visualized orbits are unremarkable.  The calvarium is intact.  The visualized paranasal sinuses and mastoid air cells are clear.    IMPRESSION:    New acute inferior left frontal lobe 4.4 x 6.2 x 4.3 cm hematoma, with   small amount of associated vasogenic edema and associated mass effect   with 0.6 cm rightward midline shift. No uncal/transtentorial or tonsillar   herniation. Follow-up imaging is advised to exclude underlying lesion   when patient is stable.    Small amount of intraventricular extension of hemorrhage. No   hydrocephalus.      EXAM:  XR CHEST AP OR PA 1V                        PROCEDURE DATE:  2018    Findings:    Prior sternotomy and valve replacement. Heart is enlarged.. Left   perihilar linear atelectasis.. The apices and hemidiaphragms are   unremarkable. Degenerative changes of the visualized osseous structures.   Prior right humeral replacement.    Impression:    Left perihilar linear atelectasis.

## 2018-07-15 NOTE — SWALLOW BEDSIDE ASSESSMENT ADULT - SWALLOW EVAL: DIAGNOSIS
1) Pt exhibits periodically reduced alertness for/orientation to feeding atop Oropharyngeal Dysphagia which subjectively appears to be a grossly functional condition with a restricted inventory of modified food textures when in an alert state. Suspect that muscle wasting and effects of SDH/ICH are contributory.  2) The patient was awake but fatigued, communicatively passive and internally distractible. She could not be directed to structured communication tasks. However, she did sometimes verbally respond when asked a concrete personally relevant question. At these times, her verbalizations were produced without a juli primary speech-language pathology. However, articulatory/phonatory effort were reduced when attempting to speak while in a fatigued state. Moreover, her verbalizations tended to be brief, somewhat tangential and contextually inappropriate at times c/w Cognitive Dysfunction(? due to SDH/ICH, ? metabolic encephalopathy, ? underlying dementia).
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
## 2018-07-15 NOTE — SWALLOW BEDSIDE ASSESSMENT ADULT - COMMENTS
The patient was admitted to  with lethargy and altered mentation. Hospital course is notable for lethargy, confusion, leukocytosis and head imaging which is remarkable for SDH/left frontal intracerebral hemorrhage. This profile is superimposed upon a HTN and frequent UTI's. This patient was admitted to  with lethargy and altered mentation. Hospital course is notable for lethargy, confusion, leukocytosis and head imaging which is remarkable for SDH/left frontal intracerebral hemorrhage. This profile is superimposed upon a HTN and frequent UTI's.

## 2018-07-15 NOTE — SWALLOW BEDSIDE ASSESSMENT ADULT - ASR SWALLOW RECOMMEND DIAG
DEFER MBS AS PT APPEARED CLINICALLY TOLERANT OF SUGGESTED PO TEXTURES FROM AN OROPHARYNGEAL SWALLOWING STANCE ON CLINICAL EXAM, DYSPHAGIA WITH A PROPENSITY TO FLUCTUATE IN SEVERITY GIVEN PROFILE AND CONSIDERING HER ALTERED MENTATION.

## 2018-07-15 NOTE — SWALLOW BEDSIDE ASSESSMENT ADULT - ADDITIONAL RECOMMENDATIONS
1) NUTRITION FOLLOW UP. PROFILE PLACES PATIENT AT NUTRITION RISK.    2) CONSIDER POTENTIAL BENEFIT OF A PALLIATIVE CARE CONSULT. 1) PT MUST BE ALERT WHEN BEING FED. CEASE PO SHOULD NEURO STATUS DECLINE AND ASPIRATION SIGNS BE NOTED.      2) NUTRITION FOLLOW UP. PROFILE PLACES PATIENT AT NUTRITION RISK.    3) CONSIDER POTENTIAL BENEFIT OF A PALLIATIVE CARE CONSULT.

## 2018-07-15 NOTE — SWALLOW BEDSIDE ASSESSMENT ADULT - SLP GENERAL OBSERVATIONS
The patient was seen at bedside. On encounter, she was frail in appearance. Temporal wasting was noted and a loss of bulk in strap muscle regions was apparent. A left hand tremor was noted at times. The patient was awake but fatigued, communicatively passive and internally distractible. She could not be directed to structured communication tasks. However, she did sometimes verbally respond when asked a concrete personally relevant question. At these times, her verbalizations were produced without a juli primary speech-language pathology. However, articulatory/phonatory effort were reduced when attempting to speak while in a fatigued state. Moreover, her verbalizations tended to be brief, somewhat tangential and contextually inappropriate at times c/w Cognitive Dysfunction.

## 2018-07-15 NOTE — SWALLOW BEDSIDE ASSESSMENT ADULT - ASR SWALLOW LABIAL MOBILITY
Limited probes revealed that a mild to moderate generalized reduction in effort/agility were noted during reflexive lip movements.

## 2018-07-15 NOTE — SWALLOW BEDSIDE ASSESSMENT ADULT - SWALLOW EVAL: RECOMMENDED DIET
SUGGEST A DYSPHAGIA 1 DIET WITH NECTAR THICK LIQUIDS AS TOLERATED. THIS IS CURRENTLY THE LEAST RESTRICTIVE TOLERABLE DIET CONSISTENCIES FROM AN OROPHARYNGEAL SWALLOWING STANCE ON CLINICAL EXAM.

## 2018-07-15 NOTE — SWALLOW BEDSIDE ASSESSMENT ADULT - ORAL PREPARATORY PHASE
Pt was variably fatigued/confused when PO was offered but did willingly open mouth. Oral aperture was decreased when accepting PO but labial grading on utensils was grossly functional.

## 2018-07-15 NOTE — SWALLOW BEDSIDE ASSESSMENT ADULT - PHARYNGEAL PHASE
Swallow trigger was timely to very mildly latent. Laryngeal lift on palpation during swallowing trials was mildly to moderately decreased but felt to be grossly functional with some modified food textures. Post prandial coughing was demonstrated at times with thin liquids only despite cues to employ compensatory swallowing maneuvers. No overt aspiration signs were demonstrated with nectar thick liquids, pureed foods and mechanical soft solids.

## 2018-07-16 VITALS
OXYGEN SATURATION: 100 % | RESPIRATION RATE: 16 BRPM | TEMPERATURE: 97 F | HEART RATE: 66 BPM | SYSTOLIC BLOOD PRESSURE: 183 MMHG | DIASTOLIC BLOOD PRESSURE: 65 MMHG

## 2018-07-16 LAB
ANION GAP SERPL CALC-SCNC: 9 MMOL/L — SIGNIFICANT CHANGE UP (ref 5–17)
BASOPHILS # BLD AUTO: 0.01 K/UL — SIGNIFICANT CHANGE UP (ref 0–0.2)
BASOPHILS NFR BLD AUTO: 0.1 % — SIGNIFICANT CHANGE UP (ref 0–2)
BUN SERPL-MCNC: 38 MG/DL — HIGH (ref 7–23)
CALCIUM SERPL-MCNC: 8.4 MG/DL — LOW (ref 8.5–10.1)
CHLORIDE SERPL-SCNC: 111 MMOL/L — HIGH (ref 96–108)
CO2 SERPL-SCNC: 22 MMOL/L — SIGNIFICANT CHANGE UP (ref 22–31)
CREAT SERPL-MCNC: 0.55 MG/DL — SIGNIFICANT CHANGE UP (ref 0.5–1.3)
EOSINOPHIL # BLD AUTO: 0.01 K/UL — SIGNIFICANT CHANGE UP (ref 0–0.5)
EOSINOPHIL NFR BLD AUTO: 0.1 % — SIGNIFICANT CHANGE UP (ref 0–6)
GLUCOSE SERPL-MCNC: 91 MG/DL — SIGNIFICANT CHANGE UP (ref 70–99)
HCT VFR BLD CALC: 39.9 % — SIGNIFICANT CHANGE UP (ref 34.5–45)
HGB BLD-MCNC: 13.2 G/DL — SIGNIFICANT CHANGE UP (ref 11.5–15.5)
IMM GRANULOCYTES NFR BLD AUTO: 0.9 % — SIGNIFICANT CHANGE UP (ref 0–1.5)
LYMPHOCYTES # BLD AUTO: 0.99 K/UL — LOW (ref 1–3.3)
LYMPHOCYTES # BLD AUTO: 7.8 % — LOW (ref 13–44)
MCHC RBC-ENTMCNC: 29.6 PG — SIGNIFICANT CHANGE UP (ref 27–34)
MCHC RBC-ENTMCNC: 33.1 GM/DL — SIGNIFICANT CHANGE UP (ref 32–36)
MCV RBC AUTO: 89.5 FL — SIGNIFICANT CHANGE UP (ref 80–100)
MONOCYTES # BLD AUTO: 0.83 K/UL — SIGNIFICANT CHANGE UP (ref 0–0.9)
MONOCYTES NFR BLD AUTO: 6.5 % — SIGNIFICANT CHANGE UP (ref 2–14)
NEUTROPHILS # BLD AUTO: 10.73 K/UL — HIGH (ref 1.8–7.4)
NEUTROPHILS NFR BLD AUTO: 84.6 % — HIGH (ref 43–77)
NRBC # BLD: 0 /100 WBCS — SIGNIFICANT CHANGE UP (ref 0–0)
PLATELET # BLD AUTO: 721 K/UL — HIGH (ref 150–400)
POTASSIUM SERPL-MCNC: 3.8 MMOL/L — SIGNIFICANT CHANGE UP (ref 3.5–5.3)
POTASSIUM SERPL-SCNC: 3.8 MMOL/L — SIGNIFICANT CHANGE UP (ref 3.5–5.3)
RBC # BLD: 4.46 M/UL — SIGNIFICANT CHANGE UP (ref 3.8–5.2)
RBC # FLD: 15.3 % — HIGH (ref 10.3–14.5)
SODIUM SERPL-SCNC: 142 MMOL/L — SIGNIFICANT CHANGE UP (ref 135–145)
WBC # BLD: 12.68 K/UL — HIGH (ref 3.8–10.5)
WBC # FLD AUTO: 12.68 K/UL — HIGH (ref 3.8–10.5)

## 2018-07-16 RX ORDER — ROBINUL 0.2 MG/ML
0.2 INJECTION INTRAMUSCULAR; INTRAVENOUS EVERY 6 HOURS
Qty: 0 | Refills: 0 | Status: DISCONTINUED | OUTPATIENT
Start: 2018-07-16 | End: 2018-07-17

## 2018-07-16 RX ORDER — MORPHINE SULFATE 50 MG/1
2 CAPSULE, EXTENDED RELEASE ORAL
Qty: 0 | Refills: 0 | Status: DISCONTINUED | OUTPATIENT
Start: 2018-07-16 | End: 2018-07-17

## 2018-07-16 RX ADMIN — Medication 5 MILLIGRAM(S): at 04:16

## 2018-07-16 RX ADMIN — AMLODIPINE BESYLATE 5 MILLIGRAM(S): 2.5 TABLET ORAL at 05:40

## 2018-07-16 NOTE — PROGRESS NOTE ADULT - SUBJECTIVE AND OBJECTIVE BOX
HPI: Pt is a 89y old Female with hx of HTN, recurrent UTI admitted from home 7/13 after being found unresponsive by home health aide. Hosp course notable for Ct revealing devastating bleed in left frontal temporal lobe with midline shift and intraventricular extension- not a candidate for neurosx intervention.  Pt was started on IV steroids and Iv abx for ?fever but per chart family had requested comfort care.  Palliative medicine consulted for assistance with goals of care.    Pt seen and examined by me with no family present at bedside for followup of goals of care.  Pt is lying in bed sleeping but wakes to my voice.    She states she has no pain and no SOB.  Ate a few bites of breakfast.    Has not recived any PRN Tylenol      PAIN: ( )Yes   (x )No    DYSPNEA: ( ) Yes  (x ) No    Review of Systems:    Anxiety-   Depression-  Physical Discomfort- none  Dyspnea- denies  Constipation-  Diarrhea-  Nausea- denies  Vomiting-  Anorexia- yes  Weight Loss-   Cough-  Secretions-  Fatigue- mild- mod  Weakness- mod  Delirium-      Limited due to: brain bleed, mental status      PHYSICAL EXAM:    Vital Signs Last 24 Hrs  T(C): 36.6 (16 Jul 2018 04:10), Max: 36.7 (15 Jul 2018 21:07)  T(F): 97.8 (16 Jul 2018 04:10), Max: 98 (15 Jul 2018 21:07)  HR: 62 (16 Jul 2018 05:40) (57 - 69)  BP: 152/43 (16 Jul 2018 05:40) (146/45 - 187/58)  RR: 16 (16 Jul 2018 04:10) (16 - 18)  SpO2: 100% (16 Jul 2018 04:10) (95% - 100%)    PPSV2:  20 %    General: thin elderly female, comfortable  Mental Status: awake, oriented to self  HEENT: moist oral mucosa  Lungs: decreased bibasilar   Cardiac: S1S2+  GI: soft, NT, + bowel sounds  : voiding, incontinent  Ext: moves UE b/l  Neuro: answers questions- some appropriately, follows some commands      LABS                          13.2   12.68 )-----------( 721      ( 16 Jul 2018 05:55 )             39.9     07-16    142  |  111<H>  |  38<H>  ----------------------------<  91  3.8   |  22  |  0.55    Ca    8.4<L>      16 Jul 2018 05:55      RADIOLOGY/ADDITIONAL STUDIES:    EXAM:  CT BRAIN                        PROCEDURE DATE:  07/13/2018    COMPARISON: Head CTs dated 1/6/2018 and 7/21/2017.    FINDINGS:      There is a new acute parenchymal inferior left frontal lobe hematoma   measuring 4.4 x 6.2 x 4.3 cm (TR by AP by CC), involving the left frontal   rectus/orbital gyri. There is small amount of surrounding associated   vision and edema. There is associated mass effect with effacement of the   involved sulci and superior displacement of the frontal horn of the left   lateral ventricle which is slitlike. There is an associated 0.6 cm   rightward midline shift at the level of the foramen of Syeda/third   ventricle. The basilar cisterns are preserved. No uncal/transtentorial or   tonsillar herniation.  Is an additional, there is intraventricular extension of hemorrhage with   blood layering within the bilateral occipital horns of the lateral   ventricles, greater on left. There is currently no evidence for   hydrocephalus, with ventricular volumes being stable since head CT of   1/6/2018.  There is unchanged patchy nonspecific bilateral cerebral white matter   hypodensities, likely reflective of mild chronic microvascular changes.  No extra-axial collection.     The visualized orbits are unremarkable.  The calvarium is intact.  The visualized paranasal sinuses and mastoid air cells are clear.    IMPRESSION:    New acute inferior left frontal lobe 4.4 x 6.2 x 4.3 cm hematoma, with   small amount of associated vasogenic edema and associated mass effect   with 0.6 cm rightward midline shift. No uncal/transtentorial or tonsillar   herniation. Follow-up imaging is advised to exclude underlying lesion   when patient is stable.    Small amount of intraventricular extension of hemorrhage. No   hydrocephalus.      EXAM:  XR CHEST AP OR PA 1V                        PROCEDURE DATE:  07/13/2018    Findings:    Prior sternotomy and valve replacement. Heart is enlarged.. Left   perihilar linear atelectasis.. The apices and hemidiaphragms are   unremarkable. Degenerative changes of the visualized osseous structures.   Prior right humeral replacement.    Impression:    Left perihilar linear atelectasis.

## 2018-07-16 NOTE — PROGRESS NOTE ADULT - SUBJECTIVE AND OBJECTIVE BOX
Reason for Admission: Altered mental status	  History of Present Illness: 	  89 y/o female with PMHx of HTN, frequent UTI presents to the ED BIBA for evaluation of AMS this morning. Pt was found this morning by home health aide, only responsive to painful stimuli in ED as per admitting attending.  Pt with Fever in the ED. As per home aide pt is normally responsive and verbal.     7/15-patient opnes eyes  to verbal stimulus and speech which is difficult to understand ,but knows her name  7/16-lethargic  Constitutional: appears ill and frail, lethargic  HEENT: Atraumatic, HANH, Normal, No congestion  Respiratory:decreased breath soundsb/l ,   Cardiovascular:  S1S2;  Gastrointestinal: Abdomen soft, non tender, Bowel Ssounds present  Extremities: No edema,  Neurological: lethargic contracted upper extremities,      PHYSICAL EXAM:    Daily     Daily     ICU Vital Signs Last 24 Hrs  T(C): 36.3 (16 Jul 2018 13:10), Max: 36.7 (15 Jul 2018 21:07)  T(F): 97.3 (16 Jul 2018 13:10), Max: 98 (15 Jul 2018 21:07)  HR: 66 (16 Jul 2018 13:10) (62 - 69)  BP: 183/65 (16 Jul 2018 13:10) (151/67 - 183/65)  BP(mean): --  ABP: --  ABP(mean): --  RR: 16 (16 Jul 2018 13:10) (16 - 18)  SpO2: 100% (16 Jul 2018 13:10) (97% - 100%)                                13.2   12.68 )-----------( 721      ( 16 Jul 2018 05:55 )             39.9       CBC Full  -  ( 16 Jul 2018 05:55 )  WBC Count : 12.68 K/uL  Hemoglobin : 13.2 g/dL  Hematocrit : 39.9 %  Platelet Count - Automated : 721 K/uL  Mean Cell Volume : 89.5 fl  Mean Cell Hemoglobin : 29.6 pg  Mean Cell Hemoglobin Concentration : 33.1 gm/dL  Auto Neutrophil # : 10.73 K/uL  Auto Lymphocyte # : 0.99 K/uL  Auto Monocyte # : 0.83 K/uL  Auto Eosinophil # : 0.01 K/uL  Auto Basophil # : 0.01 K/uL  Auto Neutrophil % : 84.6 %  Auto Lymphocyte % : 7.8 %  Auto Monocyte % : 6.5 %  Auto Eosinophil % : 0.1 %  Auto Basophil % : 0.1 %      07-16    142  |  111<H>  |  38<H>  ----------------------------<  91  3.8   |  22  |  0.55    Ca    8.4<L>      16 Jul 2018 05:55                              MEDICATIONS  (STANDING):  amLODIPine   Tablet 5 milliGRAM(s) Oral daily
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
## 2018-07-16 NOTE — CHART NOTE - NSCHARTNOTEFT_GEN_A_CORE
HPI:  89 y/o female with PMHx of HTN, frequent UTI presents to the ED BIBA for evaluation of AMS this morning. Pt was found this morning by home health aide, only responsive to painful stimuli.  Pt with Fever in the ED.  (13 Jul 2018 22:48)      PERTINENT PMH REVIEWED:  [ X ] YES [ ] NO           Primary Contact:       Brenda Ba as her primary agent and her daughter Alton Hong as her alternate agent    HCP [ X ] Surrogate [   ] Guardian [   ]    Mental Status: [ ] Alert  [  ] Oriented [  ] Confused [  ] Lethargic [ X ]  Concerns of Depression [  ]- none identified   Anxiety [   ]- none identified   Baseline ADLs (prior to admission):  Independent [ X ] moderately [ ] fully   Dependent   [ ] moderately [ ]fully    Family Meeting attendees: Pts two daughters Alton and Brenda ( Brenda by phone), son in law, Dr. Murphy, Avi Sanchez Palliative LMSW    Anticipated Grief: Patient [  ] Family [ X ]    Mandaen: Restorationist     Spiritual Concerns: None identified     Goals of Care: Comfort [ X ] Rehabilitation [  ] Curative [  ] Life Prolonging [  ]    Previous Services: Private aide 2 days a week     ADVANCE DIRECTIVES:  [ X ] YES [ ] NO   DNR [ X ] YES [ ] NO  DNI [ X ] YES [ ] NO                Anticipated D/C Plan: likely inpatient hospice[facility]                    Summary:    Team met with pts family to discuss goals of care, assist with planning and provide supportive counseling. Pt. was residing home alone and had a private hire aide only about 2 days a week. Pts family discussed how pt. has always been a very independent person and loved to go out an shop. They report if she couldn't go out and be active that would not be a quality of life to her.     Pts current medical condition and goals of care discussed. Pts daughters discussed pts medical course and how she has been up and down, which can be confusing. Team explained that this is normal and discussed that with pt. not eating or drinking her time is likely very short. Pts family expressed understanding of this and wants to focus on pts comfort. Pt. is DNR/DNI.     Team discussed both home and inpatient hospice. We explained criteria for inpatient hospice and that pt. needs to have symptoms that require IV medications. Pts family would like referral sent to inpatient and request [facility] but if no bed available they are open to [facility]. Team did also discuss back up plan of home hospice and explained they would need a 24 hour aide otherwise, other back up option would be SNF. Pts family expressed understanding of this.    Plan at this time is for referral to be sent to [facility]. Sharon Collier SW made aware of plan and will place referral. Emotional support provided. Our team will continue to follow.

## 2018-07-16 NOTE — PROGRESS NOTE ADULT - ASSESSMENT
· Assessment		  89 yo female presented with altered mental status.    A/P:    1.  Acute encephalopathy from intracerebral hemorrhage with vagogenic edema(mass effect)-mentation  deteriorating with generalised  weakness(debility) (-poor prognosis     intracranial hemorrhage  large left frontal lobe intracerebral hemorrhage. with some intraventricular extension. There is no appropriate operative option per neuro surgery. This can  cause extensive ongoing disability for the patient with respect to independence- ability to interact, abililty to communicate. family would not like any heroic measures   -prognosis poor  -not a neuro surgical candidate per neurosurgery  d/c tele  I discussed with neurosurgery KIMBER Ma -decadron not indicated in intracerebral bleed and would not improve prognosis, will d/c decadron    -avoid ASA, Plavix, heparin     # worsening dysphagia with oral secretions- liklely aspirating    -patient's prognosis is very poor     i reviewed palliative care note, family wishes comfort care, no blood draws, no vital check ,meds to be given strictly for comfort care only    2.  HTN    will d/c all meds  no more vital checks   very poor prognosis    4.  Leucocytosis possibly reactive  and from decadron/afebrile  ?h/o recent UTI    dispostion inpatient hospice, comfort care measures only

## 2018-07-16 NOTE — CDI QUERY NOTE - NSCDIOTHERTXTBX_GEN_ALL_CORE_HH
Query # 1     89 y/o female with PMHx of HTN, frequent UTI presents to the ED BIBA for evaluation of AMS secondary to cranial bleed .  pt also noted to have Leucocytosis possibly 2/2  UTI and on appropriate antibiotics. WBC noted to be greater than 12.    Clarify the appropriate diagnosis for  patient's AMS noted    A) Metabolic encephalopathy  B) Toxic encephalopathy  C) Encephalopathy  E) Other please specify  E) Clinically insignificant.      Query  # 2     89F  presents with diagnosis of   subdural hematoma and was   found to be completely dependent  and inability to move. Having to be positioned in bed for comfort. Palliative care PN  stated pt  has  " Debility" s/p  devastating brain bleed.    Please clarify if the following diagnosis is applicable to patient's current state:  A) Functional quadriplegia  B) Quadriplegia  unspecified  C )Other  please specify  D) Clinically insignificant     QUERY  # 3      Per CT  Head w no contrast Impression:  New acute inferior left frontal lobe 4.4 x 6.2 x 4.3 cm hematoma, with   small amount of associated vasogenic edema and associated mass effect with 0.6 cm rightward midline shift.    Based on the results above, please document if applicable the findings such as    A) Vasogenic edema  B) No Vasogenic edema  C) Other  please specify  D) Report is clinically insignificant.

## 2018-07-16 NOTE — GOALS OF CARE CONVERSATION - PERSONAL ADVANCE DIRECTIVE - CONVERSATION DETAILS
Reviewed the role of palliative medicine, pts course PTA (was very independent and functional), and current condition.  Pt with devastating brain bleed and her current state is dramatic change from baseline.  Her duaghters express understanding that after conversation with neurosx that her current state of being minimal alert, bedbound, and incontinent will not improve.  Based ont his they state this would never be an acceptable quality of life for pt and that she would not want any aggressive or life prolongating measures offered.    Alton had many questions about difference between palliative care, care, comfort care and hospice.  We reviewed these in detail and specifically discussed pts eligibility for hospice.  Reviewed hospice philosophy and criteria and difference between home vs inpt.  at this time reviewed option of inpt hospice.  Pt is taking minimal po intake with dysphagia and starting to develop secretions.  Concern for symptom management with po meds as pt likely will not be able to swallow in very near future due to declining mental status and challenge of using sublingual meds in pt with oral secretions due to dysphagia.  Family agreeable to hospice referral and request [facility].    We discussed initiating strict comfort focused plan of care here including D/c blood draws, IVFs and offered meds with strict goal of comfort and this was family's wish.      Confirmed pts DNR and DNI order    emotional support provided to family    Spent 40 minutes with pts family discussing advance directives, therapeutic alternatives and advance care planning

## 2018-07-17 ENCOUNTER — TRANSCRIPTION ENCOUNTER (OUTPATIENT)
Age: 83
End: 2018-07-17

## 2018-07-17 RX ORDER — CARVEDILOL PHOSPHATE 80 MG/1
1 CAPSULE, EXTENDED RELEASE ORAL
Qty: 0 | Refills: 0 | COMMUNITY

## 2018-07-17 RX ORDER — ASPIRIN/CALCIUM CARB/MAGNESIUM 324 MG
1 TABLET ORAL
Qty: 0 | Refills: 0 | COMMUNITY

## 2018-07-17 RX ORDER — ROBINUL 0.2 MG/ML
0.2 INJECTION INTRAMUSCULAR; INTRAVENOUS
Qty: 0 | Refills: 0 | COMMUNITY
Start: 2018-07-17

## 2018-07-17 RX ORDER — CLOPIDOGREL BISULFATE 75 MG/1
1 TABLET, FILM COATED ORAL
Qty: 0 | Refills: 0 | COMMUNITY

## 2018-07-17 RX ORDER — FUROSEMIDE 40 MG
1 TABLET ORAL
Qty: 0 | Refills: 0 | COMMUNITY

## 2018-07-17 RX ORDER — MORPHINE SULFATE 50 MG/1
2 CAPSULE, EXTENDED RELEASE ORAL
Qty: 0 | Refills: 0 | COMMUNITY
Start: 2018-07-17

## 2018-07-17 NOTE — DISCHARGE NOTE ADULT - PATIENT PORTAL LINK FT
You can access the [facility], offered by [facility], by registering with the following website: [email or web address]

## 2018-07-17 NOTE — DISCHARGE NOTE ADULT - MEDICATION SUMMARY - MEDICATIONS TO TAKE
I will START or STAY ON the medications listed below when I get home from the hospital:    morphine  -- 2 milligram(s) intravenously every 2 to 3 hours, As Needed  -- Indication: For for pain/sob    LORazepam  -- 0.5 milligram(s) intravenous every 6 to 8 hours, As Needed  -- Indication: For anxiety    glycopyrrolate 0.2 mg/mL injectable solution  -- 0.2 milligram(s) intravenous every 6 to 8 hours, As Needed  -- Indication: For .    polyethylene glycol 3350 oral powder for reconstitution  -- 17 gram(s) by mouth once a day, As Needed  -- Indication: For constipation    senna oral tablet  -- 2 tab(s) by mouth once a day (at bedtime), As needed, Constipation  -- Indication: For constipation

## 2018-07-17 NOTE — DISCHARGE NOTE ADULT - CARE PROVIDER_API CALL
Soraya Murphy (DO), Geriatric Medicine; HospicePalliative Medicine; Internal Medicine  [address] [place] [address]  Phone: [phone]  Fax: [phone]

## 2018-07-17 NOTE — DISCHARGE NOTE ADULT - MEDICATION SUMMARY - MEDICATIONS TO STOP TAKING
I will STOP taking the medications listed below when I get home from the hospital:    Aspirin Low Dose 81 mg oral delayed release tablet  -- 1 tab(s) by mouth once a day    Coreg 6.25 mg oral tablet  -- 1 tab(s) by mouth 2 times a day    Paxil 20 mg oral tablet  -- 1 tab(s) by mouth once a day    Plavix 75 mg oral tablet  -- 1 tab(s) by mouth once a day    ferrous sulfate 325 mg (65 mg elemental iron) oral delayed release tablet  -- 1 tab(s) by mouth once a day    Multiple Vitamins oral tablet  -- 1 tab(s) by mouth once a day    Lasix 20 mg oral tablet  -- 1 tab(s) by mouth once a day
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
## 2018-07-17 NOTE — PROGRESS NOTE ADULT - SUBJECTIVE AND OBJECTIVE BOX
HPI: Pt is a 89y old Female with hx of HTN, recurrent UTI admitted from home 7/13 after being found unresponsive by home health aide. Hosp course notable for Ct revealing devastating bleed in left frontal temporal lobe with midline shift and intraventricular extension- not a candidate for neurosx intervention.  Pt was started on IV steroids and Iv abx for ?fever but per chart family had requested comfort care.  Palliative medicine consulted for assistance with goals of care.    Pt seen and examined by me with no family present at bedside for followup of goals of care.  Pt is lying in bed awake being fed by nursing assistant.     She states she has no pain and no SOB.     PAIN: ( )Yes   (x )No    DYSPNEA: ( ) Yes  (x ) No    Review of Systems:    Anxiety-   Depression-  Physical Discomfort- none  Dyspnea- denies  Constipation-  Diarrhea-  Nausea- denies  Vomiting-  Anorexia- yes  Weight Loss-   Cough-  Secretions-  Fatigue- mild- mod  Weakness- mod  Delirium-      Limited due to: brain bleed, mental status      PHYSICAL EXAM:  ICU Vital Signs Last 24 Hrs  T(C): 36.3 (16 Jul 2018 13:10), Max: 36.3 (16 Jul 2018 13:10)  T(F): 97.3 (16 Jul 2018 13:10), Max: 97.3 (16 Jul 2018 13:10)  HR: 66 (16 Jul 2018 13:10) (66 - 66)  BP: 183/65 (16 Jul 2018 13:10) (183/65 - 183/65)  BP(mean): --  ABP: --  ABP(mean): --  RR: 16 (16 Jul 2018 13:10) (16 - 16)  SpO2: 100% (16 Jul 2018 13:10) (100% - 100%)    PPSV2:  20 %    General: thin elderly female, comfortable  Mental Status: awake, oriented to self  HEENT: moist oral mucosa  Lungs: decreased bibasilar   Cardiac: S1S2+  GI: soft, NT, + bowel sounds  : voiding, incontinent  Ext: moves UE b/l  Neuro: answers questions- some appropriately, follows some commands      LABS                        13.2   12.68 )-----------( 721      ( 16 Jul 2018 05:55 )             39.9                          07-16    142  |  111<H>  |  38<H>  ----------------------------<  91  3.8   |  22  |  0.55    Ca    8.4<L>      16 Jul 2018 05:55

## 2018-07-17 NOTE — DISCHARGE NOTE ADULT - HOSPITAL COURSE
Reason for Admission: Altered mental status	  History of Present Illness: 	  89 y/o female with PMHx of HTN, frequent UTI presents to the ED BIBA for evaluation of AMS this morning. Pt was found this morning by home health aide, only responsive to painful stimuli in ED as per admitting attending.  Pt with Fever in the ED. As per home aide pt is normally responsive and verbal.     7/15-patient opnes eyes  to verbal stimulus and speech which is difficult to understand ,but knows her name  7/16-lethargic  Constitutional: appears ill and frail, lethargic      Neurological: lethargic contracted upper extremities,  · Assessment		  89 yo female presented with altered mental status.    A/P:    1.  Acute encephalopathy from intracerebral hemorrhage with vagogenic edema(mass effect)-mentation  deteriorating with generalised  weakness(debility) (-poor prognosis     intracranial hemorrhage  large left frontal lobe intracerebral hemorrhage. with some intraventricular extension. There is no appropriate operative option per neuro surgery. This can  cause extensive ongoing disability for the patient with respect to independence- ability to interact, abililty to communicate. family would not like any heroic measures   -prognosis poor  -not a neuro surgical candidate per neurosurgery  d/c tele  I discussed with neurosurgery KIMBER Ma -decadron not indicated in intracerebral bleed and would not improve prognosis, will d/c decadron    -avoid ASA, Plavix, heparin     # worsening dysphagia with oral secretions- liklely aspirating    -patient's prognosis is very poor     i reviewed palliative care note, family wishes comfort care, no blood draws, no vital check ,meds to be given strictly for comfort care only    2.  HTN    will d/c all meds  no more vital checks   very poor prognosis4.  Leucocytosis possibly reactive  and from decadron/afebrile  ?h/o recent UTI    dispostion inpatient hospice, comfort care measures only

## 2018-07-17 NOTE — DISCHARGE NOTE ADULT - CARE PLAN
Principal Discharge DX:	SDH (subdural hematoma)  Goal:	inpatient hospice /comfort care only  Assessment and plan of treatment:	see below

## 2018-07-20 DIAGNOSIS — Z88.5 ALLERGY STATUS TO NARCOTIC AGENT: ICD-10-CM

## 2018-07-20 DIAGNOSIS — R13.10 DYSPHAGIA, UNSPECIFIED: ICD-10-CM

## 2018-07-20 DIAGNOSIS — I62.00 NONTRAUMATIC SUBDURAL HEMORRHAGE, UNSPECIFIED: ICD-10-CM

## 2018-07-20 DIAGNOSIS — D72.829 ELEVATED WHITE BLOOD CELL COUNT, UNSPECIFIED: ICD-10-CM

## 2018-07-20 DIAGNOSIS — G93.6 CEREBRAL EDEMA: ICD-10-CM

## 2018-07-20 DIAGNOSIS — G93.40 ENCEPHALOPATHY, UNSPECIFIED: ICD-10-CM

## 2018-07-20 DIAGNOSIS — Z88.8 ALLERGY STATUS TO OTHER DRUGS, MEDICAMENTS AND BIOLOGICAL SUBSTANCES STATUS: ICD-10-CM

## 2018-07-20 DIAGNOSIS — Z79.82 LONG TERM (CURRENT) USE OF ASPIRIN: ICD-10-CM

## 2018-07-20 DIAGNOSIS — I10 ESSENTIAL (PRIMARY) HYPERTENSION: ICD-10-CM

## 2018-07-20 DIAGNOSIS — R41.82 ALTERED MENTAL STATUS, UNSPECIFIED: ICD-10-CM

## 2018-07-20 DIAGNOSIS — Z79.899 OTHER LONG TERM (CURRENT) DRUG THERAPY: ICD-10-CM

## 2018-12-07 NOTE — DISCHARGE NOTE ADULT - PLAN OF CARE
Reason For Visit  KATIE ALANIZ is an established patient here today for a chief complaint of pt states that is here for lab orders .   :  services not used.   A chaperone is not applicable. She is unaccompanied.        Quality    Adult Wellness CI height documented, discussion of regular exercise, exercising regularly, printed information given for activities, discussion of nutritional quality of diet, patient education given about proper diet, not using alcohol, not having considered quitting drinking, not getting angry when talked to about drinking, not having a drink or two in the morning to get going, not drinking alcohol regularly, and feeling guilty about it, no tobacco use, provided intervention and counseling in regards to tobacco use, pap smear performed: 02/23/2018, does not have feelings of hopelessness (PHQ-2), no Anhedonia (PHQ-2), not referred to local mental health center, not taking medication for depression, no monitoring patient, preventive medicine therapy for influenza, no preventive medicine therapy for pneumococcal and pain scale level not reviewed.      History of Present Illness  Patient seen per ophtalmologist for visual disturbance, refraction showed changes in short period of time,  recommended to check her blood sugar.      Review of Systems    All other systems reviewed and negative.      Allergies  No Known Drug Allergies    Current Meds   1. Phentermine HCl - 30 MG Oral Capsule; TAKE 1 CAPSULE EVERY MORNING BEFORE   BREAKFAST;   Therapy: [id number] to (Evaluate:24Apr2018)  Requested for: [id number]; Last   Rx:[id number] Ordered    Active Problems  ASCUS on Pap smear (R89.6)   5/13 ASCUS HRHPV+, cx bx and ecc neg      4/14 nl pap HRHPV-  Atypical squamous cells of undetermined significance on cytologic smear of vagina  (ASC-US) (R87.620)  Bacterial vaginosis (N76.0,B96.89)  Birth control (Z30.9)  Diabetes mellitus screening (Z13.1)  Dysuria (R30.0)  Encounter for  general adult medical examination with abnormal findings (Z00.01)  Encounter for weight loss counseling (Z71.3)  Epigastric pain (R10.13)  Hair loss (L65.9)  Irregular periods/menstrual cycles (N92.6)  Nail discoloration (L60.8)  Obesity (BMI 30-39.9) (E66.9)  Pain aggravated by eating or drinking (R52)  Pap smear for cervical cancer screening (Z12.4)  Screen for STD (sexually transmitted disease) (Z11.3)  Screening for HIV (human immunodeficiency virus) (Z11.4)  TMJ (dislocation of temporomandibular joint) (S03.00XA)  Vegetarian (Z78.9)  Weight gain (R63.5)  Yeast vaginitis (B37.3)   · Last Impression: 08 Jan 2016  Previous vaginal pathogens were normal, it does look      like candida infection I will treat with fluconazole 150mg today and will repeat in 72      hours. I explained that oral contraceptives can increase risk of vaginal infection.  GIRISH ROMO (Primary Care)    Past Medical History  History of No Recent Change In Medical History    Surgical History  Denied: History of Surgery    Family History  Father   Family history of Diabetes Mellitus  Family history of hypertension (Z82.49)    Social History  Never smoker  No drug use  Non-smoker (Z78.9)  Social alcohol use  Vegetarian (Z78.9)    Vitals  Signs   Recorded: 17Nov2018 11:28AM   Height: 5 ft   Weight: 160 lb   BMI Calculated: 31.25  BSA Calculated: 1.7  Systolic: 110, LUE, Sitting  Diastolic: 80, LUE, Sitting  Temperature: 98.2 F, Temporal  Heart Rate: 85  Respiration: 18  O2 Saturation: 99    Physical Exam  Constitutional: in no acute distress and current vital signs reviewed . overweight.   Eyes: no discharge, normal conjunctiva, no eyelid swelling and the sclerae were normal. pupils equal, round and reactive to light and accommodation.   Pulmonary: breath sounds clear to auscultation bilaterally.   Cardiovascular: normal rate, regular rhythm, normal S1 and normal S2.      Results/Data    17Nov2018 12:52PM   COMP METABOLIC PANEL WITH LIPID  PANEL (CPNL,LIPDPL)   SODIUM: 142 mmol/L Reference Range 135-145  POTASSIUM: 4.0 mmol/L Reference Range 3.4-5.1  CHLORIDE: 108 mmol/L Abnormal High Reference Range   CARBON DIOXIDE: 27 mmol/L Reference Range 21-32  ANION GAP: 11 mmol/L Reference Range 10-20  GLUCOSE: 72 mg/dl Reference Range 65-99  BUN: 8 mg/dl Reference Range 6-20  CREATININE: 0.58 mg/dl Reference Range 0.51-0.95  GFR EST. AMER: >90   GFR EST.NONAFRI AMER: >90   BUN/CREATININE RATIO: 14  Reference Range 7-25  BILIRUBIN TOTAL: 0.3 mg/dl Reference Range 0.2-1.0  GOT/AST: 24 Units/L Reference Range <38  ALKALINE PHOSPHATASE: 72 Units/L Reference Range   ALBUMIN: 4.0 g/dl Reference Range 3.6-5.1  TOTAL PROTEIN: 6.9 g/dl Reference Range 6.4-8.2  GLOBULIN (CALCULATED): 2.9 g/dl Reference Range 2.0-4.0  A/G RATIO: 1.4  Reference Range 1.0-2.4  CALCIUM: 8.8 mg/dl Reference Range 8.4-10.2  GPT/ALT: 30 Units/L Reference Range <79  FASTING STATUS: 12 hrs  CHOLESTEROL: 192 mg/dl Reference Range <200  HDL CHOLESTEROL: 54 mg/dl Reference Range >49  TRIGLYCERIDES: 92 mg/dl Reference Range <150  LDL CHOLESTEROL (CALCULATED): 120 mg/dl Reference Range <130  NON-HDL CHOLESTEROL: 138 mg/dl  CHOLESTEROL/HDL RATIO: 3.6  Reference Range <4.5  FASTING STATUS: 12 hrs  06Mar2015 12:01AM   COMP METABOLIC PANEL WITH CBCA, LIPID PANEL AND TSH (CMP,CBCA,LIPFA,TSH)   WHITE BLOOD COUNT: 7.6 K/mcL Reference Range 4.2-11.0  RED CELL COUNT: 4.03 mil/mcL Reference Range 4.00-5.20  HEMOGLOBIN: 12.5 g/dl Reference Range 12.0-15.5  HEMATOCRIT: 37.7 % Reference Range 36.0-46.5  MEAN CORPUSCULAR VOLUME: 93.5 fL Reference Range 78.0-100.0  MEAN CORPUSCULAR HEMOGLOBIN: 31.0 pg Reference Range 26.0-34.0  MEAN CORPUSCULAR HGB CONC: 33.2 g/dl Reference Range 32.0-36.5  RDW-CV: 12.7 % Reference Range 11.0-15.0  PLATELET COUNT: 327 K/mcL Reference Range 140-450  CRISTOBAL%: 72 %  LYM%: 24 %  MON%: 4 %  EOS%: 0 %  BASO%: 0 %  CRISTOBAL ABS: 5.4 K/mcL Reference Range 1.8-7.7  LYM ABS: 1.8  K/mcL Reference Range 1.0-4.8  MON ABS: 0.3 K/mcL Reference Range 0.3-0.9  EOS ABS: 0.0 K/mcL Abnormal Low Reference Range 0.1-0.5  BASO ABS: 0.0 K/mcL Reference Range 0.0-0.3  SODIUM: 140 mmol/L Reference Range 135-145  POTASSIUM: 4.1 mmol/L Reference Range 3.4-5.1  CHLORIDE: 107 mmol/L Reference Range   CARBON DIOXIDE: 23 mmol/L Reference Range 21-32  ANION GAP: 14 mmol/L Reference Range 10-20  GLUCOSE: 76 mg/dl Reference Range 65-99  BUN: 10 mg/dl Reference Range 10-20  CREATININE: 0.70 mg/dl Reference Range 0.50-1.10  GFR EST. AMER: >60  Reference Range >59  GFR EST.NONAFRI AMER: >60  Reference Range >59  BUN/CREATININE RATIO: 14  Reference Range 7-25  BILIRUBIN TOTAL: 0.4 mg/dl Reference Range 0.2-1.0  GOT/AST: 13 Units/L Reference Range <38  ALKALINE PHOSPHATASE: 64 Units/L Reference Range   ALBUMIN: 3.9 g/dl Reference Range 3.4-5.0  TOTAL PROTEIN: 7.0 g/dl Reference Range 6.4-8.2  GLOBULIN (CALCULATED): 3.1 g/dl Reference Range 2.0-4.0  A/G RATIO: 1.3  Reference Range 1.0-2.4  CALCIUM: 9.1 mg/dl Reference Range 8.4-10.2  GPT/ALT: 23 Units/L Reference Range <79  FASTING STATUS: UNKNOWN hrs  CHOLESTEROL: 147 mg/dl Reference Range 100-200  HDL CHOLESTEROL: 45 mg/dl Reference Range >39  TRIGLYCERIDES: 103 mg/dl Reference Range <150  LDL CHOLESTEROL (CALCULATED): 81 mg/dl Reference Range <130  NON-HDL CHOLESTEROL: 102 mg/dl  CHOLESTEROL/HDL RATIO: 3.3  Reference Range <4.5  TSH: 1.248 mcUnits/mL Reference Range 0.350-5.000  DIFF TYPE: AUTOMATED DIFFERENTIAL   FASTING STATUS: UNKNOWN hrs    Immunizations  Tdap --- Series1: 06-Mar-2015     Assessment  Visual disturbances (H53.9)  Overweight (E66.3)  Screening for diabetes mellitus (Z13.1)    Plan  COMP METABOLIC PANEL WITH LIPID PANEL (CPNL,LIPDPL); Status:Resulted -  Requires Verification;   Done: 17Nov2018 12:52PM    Lab. ordered, will follow and notify.     Signatures   Electronically signed by : Leslie Larson CMA; Nov 17 2018 11:31AM CST     Electronically signed by : SUKHI BECKER M.D.; Nov 17 2018  8:19PM CST     inpatient hospice /comfort care only see below

## 2019-09-08 NOTE — ED PROVIDER NOTE - LIVES WITH, PROFILE
Called Oralia Nazario @ [facility] for update. She will forward to admission for potential transfer tomorrow pending repeat UA and monitoring overnight. family/other relative

## 2021-04-28 NOTE — SWALLOW BEDSIDE ASSESSMENT ADULT - ASR SWALLOW LINGUAL MOBILITY
Limited probes revealed that a mild to moderate generalized reduction in effort/agility were noted during reflexive tongue movements. No joint pain, swelling or deformity; no limitation of movement

## 2023-07-03 NOTE — ED ADULT NURSE NOTE - NS ED NURSE RECORD ANOTHER VITAL SIGN
Alecia Cazares left a message requesting Premarin cream renewal to [facility]. Sent message to Dr. Mayte Evans due to Dr. Guadelupe Bamberger is out of the office. Yes
